# Patient Record
Sex: MALE | Race: OTHER | Employment: FULL TIME | URBAN - METROPOLITAN AREA
[De-identification: names, ages, dates, MRNs, and addresses within clinical notes are randomized per-mention and may not be internally consistent; named-entity substitution may affect disease eponyms.]

---

## 2017-10-12 ENCOUNTER — APPOINTMENT (OUTPATIENT)
Dept: LAB | Age: 27
End: 2017-10-12
Attending: FAMILY MEDICINE
Payer: COMMERCIAL

## 2017-10-12 ENCOUNTER — OFFICE VISIT (OUTPATIENT)
Dept: FAMILY MEDICINE CLINIC | Facility: CLINIC | Age: 27
End: 2017-10-12

## 2017-10-12 VITALS
SYSTOLIC BLOOD PRESSURE: 113 MMHG | BODY MASS INDEX: 30.07 KG/M2 | TEMPERATURE: 98 F | DIASTOLIC BLOOD PRESSURE: 72 MMHG | WEIGHT: 203 LBS | HEIGHT: 69 IN | HEART RATE: 53 BPM

## 2017-10-12 DIAGNOSIS — Z86.19 HISTORY OF CHLAMYDIA: ICD-10-CM

## 2017-10-12 DIAGNOSIS — R30.0 DYSURIA: ICD-10-CM

## 2017-10-12 DIAGNOSIS — B35.1 ONYCHOMYCOSIS OF TOENAIL: Primary | ICD-10-CM

## 2017-10-12 DIAGNOSIS — B35.1 ONYCHOMYCOSIS OF TOENAIL: ICD-10-CM

## 2017-10-12 PROCEDURE — 87591 N.GONORRHOEAE DNA AMP PROB: CPT

## 2017-10-12 PROCEDURE — 87491 CHLMYD TRACH DNA AMP PROBE: CPT

## 2017-10-12 PROCEDURE — 84450 TRANSFERASE (AST) (SGOT): CPT

## 2017-10-12 PROCEDURE — 86780 TREPONEMA PALLIDUM: CPT

## 2017-10-12 PROCEDURE — 99203 OFFICE O/P NEW LOW 30 MIN: CPT | Performed by: FAMILY MEDICINE

## 2017-10-12 PROCEDURE — 99212 OFFICE O/P EST SF 10 MIN: CPT | Performed by: FAMILY MEDICINE

## 2017-10-12 PROCEDURE — 84460 ALANINE AMINO (ALT) (SGPT): CPT

## 2017-10-12 PROCEDURE — 36415 COLL VENOUS BLD VENIPUNCTURE: CPT

## 2017-10-12 PROCEDURE — 87389 HIV-1 AG W/HIV-1&-2 AB AG IA: CPT

## 2017-10-12 PROCEDURE — 81003 URINALYSIS AUTO W/O SCOPE: CPT

## 2017-10-12 NOTE — PROGRESS NOTES
Patient ID: Liam Mireles is a 32year old male. HPI  Patient presents with:  Toenail Fungus  STD  He is a new patient. He states about 2 or 3 years ago he had terrible fungus on his toes and even on his fingernails.   He is placed on Lamisil for about 3 no genital warts. ASSESSMENT/PLAN:     Diagnoses and all orders for this visit:    Onychomycosis of toenail  -     AST (SGOT); Future  -     ALT (SGPT);  Future  Liver function tests and if all is normal he can do Lamisil again  Dysuria  -     URINA

## 2017-10-13 ENCOUNTER — PATIENT MESSAGE (OUTPATIENT)
Dept: FAMILY MEDICINE CLINIC | Facility: CLINIC | Age: 27
End: 2017-10-13

## 2017-11-14 ENCOUNTER — TELEPHONE (OUTPATIENT)
Dept: OTHER | Age: 27
End: 2017-11-14

## 2017-11-14 ENCOUNTER — PATIENT MESSAGE (OUTPATIENT)
Dept: FAMILY MEDICINE CLINIC | Facility: CLINIC | Age: 27
End: 2017-11-14

## 2017-11-14 RX ORDER — TERBINAFINE HYDROCHLORIDE 250 MG/1
250 TABLET ORAL DAILY
Qty: 30 TABLET | Refills: 1 | Status: SHIPPED | OUTPATIENT
Start: 2017-11-14 | End: 2018-05-16

## 2017-11-15 NOTE — TELEPHONE ENCOUNTER
Refill Protocol Appointment Criteria  · Appointment scheduled in the past 6 months or in the next 3 months  Recent Outpatient Visits            1 month ago Onychomycosis of toenail    [address], [place], [place]    Office Visit

## 2018-01-23 ENCOUNTER — OFFICE VISIT (OUTPATIENT)
Dept: ORTHOPEDICS CLINIC | Facility: CLINIC | Age: 28
End: 2018-01-23

## 2018-01-23 ENCOUNTER — HOSPITAL ENCOUNTER (OUTPATIENT)
Dept: GENERAL RADIOLOGY | Facility: HOSPITAL | Age: 28
Discharge: HOME | End: 2018-01-23
Attending: ORTHOPAEDIC SURGERY
Payer: COMMERCIAL

## 2018-01-23 DIAGNOSIS — M25.562 ACUTE PAIN OF LEFT KNEE: Primary | ICD-10-CM

## 2018-01-23 DIAGNOSIS — IMO0001 DISLOCATION: ICD-10-CM

## 2018-01-23 PROCEDURE — 99212 OFFICE O/P EST SF 10 MIN: CPT | Performed by: ORTHOPAEDIC SURGERY

## 2018-01-23 PROCEDURE — 99243 OFF/OP CNSLTJ NEW/EST LOW 30: CPT | Performed by: ORTHOPAEDIC SURGERY

## 2018-01-23 PROCEDURE — 73560 X-RAY EXAM OF KNEE 1 OR 2: CPT | Performed by: ORTHOPAEDIC SURGERY

## 2018-01-23 PROCEDURE — 73565 X-RAY EXAM OF KNEES: CPT | Performed by: ORTHOPAEDIC SURGERY

## 2018-01-23 NOTE — PROGRESS NOTES
1/23/2018  Fabiano Esquivel  12/26/1990  32year old   male  Johnnie Mckinnon MD    HPI:   Patient presents with:  Consult: Pt is here for his left knee. Knee feels weak. Has dislocated over 10 times thru out the past 4 years.  Each time this has happened it compartments are soft. The patient's lower extremities are warm and pink with brisk capillary refill and 2+ distal pulses. Sensation is intact to light touch in superficial peroneal, deep peroneal, sural, saphenous, and tibial nerve distributions.   The p

## 2018-01-24 ENCOUNTER — TELEPHONE (OUTPATIENT)
Dept: ORTHOPEDICS CLINIC | Facility: CLINIC | Age: 28
End: 2018-01-24

## 2018-01-24 RX ORDER — TERBINAFINE HYDROCHLORIDE 250 MG/1
250 TABLET ORAL DAILY
Qty: 30 TABLET | Refills: 1 | OUTPATIENT
Start: 2018-01-24

## 2018-01-24 NOTE — TELEPHONE ENCOUNTER
From: Glen Ramirez  Sent: 1/22/2018 8:50 AM CST  Subject: Medication Renewal Request    Glen Ramirez would like a refill of the following medications:     Terbinafine HCl (LAMISIL) 250 MG Oral Tab Celsa Lieberman DO]   Patient Comment: I need a refill    Pre

## 2018-01-24 NOTE — TELEPHONE ENCOUNTER
I have given him 30 with 2 refills. He should be on this medication only for 3 months maximum. It will take much longer than that for the new nail however to grow out.

## 2018-01-24 NOTE — TELEPHONE ENCOUNTER
Called [facility] and s/w Lorrene Pill to initiate RQI for MRI left knee. She states pt is coming up as termed in her system. Need to call the health plan direct to see if RQI needed. Called [employer] [place] and s/w maura to see if pt requires RQI or PA for MRI left knee.  Sh

## 2018-01-24 NOTE — TELEPHONE ENCOUNTER
Please advise regarding pended refill request as does not fall under a protocol.      ALT/AST checked 10/12/17 before beginning toe nail fungus tx were normal    Last Rx= 11/14/17 #30 & 1-R  Recent Outpatient Visits            3 months ago Onychomycosis of

## 2018-01-26 ENCOUNTER — PATIENT MESSAGE (OUTPATIENT)
Dept: FAMILY MEDICINE CLINIC | Facility: CLINIC | Age: 28
End: 2018-01-26

## 2018-01-27 RX ORDER — TERBINAFINE HYDROCHLORIDE 250 MG/1
250 TABLET ORAL DAILY
Qty: 30 TABLET | Refills: 1 | OUTPATIENT
Start: 2018-01-27

## 2018-01-27 NOTE — TELEPHONE ENCOUNTER
From: Liam Mireles  To: Roberta Cortés DO  Sent: 1/26/2018 4:39 PM CST  Subject: Prescription Question    Hi, I need a refill for my toes. I've requested it a few times but never got a response.     Thank you  Jacqui Manuel

## 2018-01-27 NOTE — TELEPHONE ENCOUNTER
I have given him 30 with 2 refills. He should be on this medication only for 3 months maximum. It will take much longer than that for the new nail however to grow out.       Electronically signed by Stacey Nguyen, DO at 1/24/2018  3:22 PM    Just because

## 2018-01-27 NOTE — TELEPHONE ENCOUNTER
Called pt to triage if fungus of toenails is present?  Last LFT 10/2017 wnl, LMTCB or to respond via mychart

## 2018-01-29 NOTE — TELEPHONE ENCOUNTER
Advised patient of Dr. Jordan Dorsey note below. Patient verbalized understanding. Instructed patient to keep toes dry and take precaution when using public showers/gym.

## 2018-01-29 NOTE — TELEPHONE ENCOUNTER
From: Sharee Stock  Sent: 1/25/2018 12:16 PM CST  Subject: Medication Renewal Request    Sharee Stock would like a refill of the following medications:     Terbinafine HCl (LAMISIL) 250 MG Oral Tab Tika Nickerson DO]    Preferred pharmacy: [facility] [address] [place] [address], [phone], [phone]

## 2018-02-03 ENCOUNTER — HOSPITAL ENCOUNTER (OUTPATIENT)
Dept: MRI IMAGING | Facility: HOSPITAL | Age: 28
Discharge: HOME | End: 2018-02-03
Attending: ORTHOPAEDIC SURGERY
Payer: COMMERCIAL

## 2018-02-03 DIAGNOSIS — M25.562 ACUTE PAIN OF LEFT KNEE: ICD-10-CM

## 2018-02-03 PROCEDURE — 73721 MRI JNT OF LWR EXTRE W/O DYE: CPT | Performed by: ORTHOPAEDIC SURGERY

## 2018-02-09 ENCOUNTER — OFFICE VISIT (OUTPATIENT)
Dept: ORTHOPEDICS CLINIC | Facility: CLINIC | Age: 28
End: 2018-02-09

## 2018-02-09 DIAGNOSIS — S83.282A ACUTE LATERAL MENISCUS TEAR OF LEFT KNEE, INITIAL ENCOUNTER: ICD-10-CM

## 2018-02-09 DIAGNOSIS — S83.512A NEW ACL TEAR, LEFT, INITIAL ENCOUNTER: Primary | ICD-10-CM

## 2018-02-09 DIAGNOSIS — S83.242A ACUTE MEDIAL MENISCUS TEAR, LEFT, INITIAL ENCOUNTER: ICD-10-CM

## 2018-02-09 PROCEDURE — 99212 OFFICE O/P EST SF 10 MIN: CPT | Performed by: ORTHOPAEDIC SURGERY

## 2018-02-09 PROCEDURE — 99213 OFFICE O/P EST LOW 20 MIN: CPT | Performed by: ORTHOPAEDIC SURGERY

## 2018-02-09 NOTE — PROGRESS NOTES
2/9/2018  Mary Beth Pulling  12/26/1990  32year old   male  Ishmael Schwab MD    HPI:   Patient presents with:  Knee Pain: Left f/u and MRI results - states he has pain only when it navi.     This is a pleasant 59-year-old male that comes in today for MR initial encounter  (primary encounter diagnosis)  Acute medial meniscus tear, left, initial encounter  Acute lateral meniscus tear of left knee, initial encounter    This is a pleasant 80-year-old male that sustained a left knee ACL tear, medial meniscus t

## 2018-03-22 ENCOUNTER — TELEPHONE (OUTPATIENT)
Dept: FAMILY MEDICINE CLINIC | Facility: CLINIC | Age: 28
End: 2018-03-22

## 2018-03-22 DIAGNOSIS — B35.1 ONYCHOMYCOSIS OF TOENAIL: Primary | ICD-10-CM

## 2018-03-22 NOTE — TELEPHONE ENCOUNTER
We need to have him see podiatry. They may need to take some samples of the toes that are not getting cleared up to find out if it is a different type of fungus that does not get cleared up with the Lamisil.   I would do a referral.

## 2018-03-22 NOTE — TELEPHONE ENCOUNTER
----- Message from Erich Rhodes sent at 3/20/2018 10:28 AM CDT -----  Regarding: Non-Urgent Medical Question  Contact: [phone]  Hi, so my toe nail fungus never fully went away and now it's slowly getting worse. What do you recommend me to do?     Thank

## 2018-03-22 NOTE — TELEPHONE ENCOUNTER
Spoke with patient who states original treatment cleared up the nail fungus. States that his other toes were originally effected and that has cleared up. States this is only occurring on his big toes.  He states that the right big toe has two streak

## 2018-05-10 ENCOUNTER — TELEPHONE (OUTPATIENT)
Dept: ORTHOPEDICS CLINIC | Facility: CLINIC | Age: 28
End: 2018-05-10

## 2018-05-10 NOTE — TELEPHONE ENCOUNTER
Surgery outpatient with Dr. Sagar Bell  5-64-05  [facility]  Left knee scope acl reconstruction with patella autograft vs quad autograft  mmr vs pmm  lmr vs plm    [id number]    [id number]  Diagnosis codes : E43.155D   K85.744B    B08.184A    Call to [address] [place] [address]

## 2018-05-22 ENCOUNTER — OFFICE VISIT (OUTPATIENT)
Dept: ORTHOPEDICS CLINIC | Facility: CLINIC | Age: 28
End: 2018-05-22

## 2018-05-22 DIAGNOSIS — S83.242A ACUTE MEDIAL MENISCUS TEAR OF LEFT KNEE, INITIAL ENCOUNTER: ICD-10-CM

## 2018-05-22 DIAGNOSIS — S83.282A ACUTE LATERAL MENISCUS TEAR OF LEFT KNEE, INITIAL ENCOUNTER: ICD-10-CM

## 2018-05-22 DIAGNOSIS — S83.512A NEW ACL TEAR, LEFT, INITIAL ENCOUNTER: Primary | ICD-10-CM

## 2018-05-22 PROCEDURE — 99212 OFFICE O/P EST SF 10 MIN: CPT | Performed by: ORTHOPAEDIC SURGERY

## 2018-05-22 PROCEDURE — 99213 OFFICE O/P EST LOW 20 MIN: CPT | Performed by: ORTHOPAEDIC SURGERY

## 2018-05-22 NOTE — H&P
5/22/2018  Nallely Mac  12/26/1990  32year old   male  Rashid Buchanan MD    HPI:   Patient presents with:  Pre-Op: Left ACL - has sx scheduled for 5/24/18 - has no pain in the knee     This is a pleasant 20-year-old male that sustained a left knee [facility] encounter diagnosis)  Acute medial meniscus tear of left knee, initial encounter  Acute lateral meniscus tear of left knee, initial encounter    The risks, indications, benefits, and procedures of both operative and non-operative treatment were discussed w

## 2018-05-23 NOTE — TELEPHONE ENCOUNTER
From pt via email - FMLA form for Dr. Damon Patel [employer]+ Signed release. Paid $25 over phone, receipt emailed to pt. Logged for processing.  NK

## 2018-05-24 ENCOUNTER — SURGERY (OUTPATIENT)
Age: 28
End: 2018-05-24

## 2018-05-24 ENCOUNTER — APPOINTMENT (OUTPATIENT)
Dept: GENERAL RADIOLOGY | Facility: HOSPITAL | Age: 28
End: 2018-05-24
Attending: ORTHOPAEDIC SURGERY
Payer: COMMERCIAL

## 2018-05-24 ENCOUNTER — ANESTHESIA EVENT (OUTPATIENT)
Dept: SURGERY | Facility: HOSPITAL | Age: 28
End: 2018-05-24
Payer: COMMERCIAL

## 2018-05-24 ENCOUNTER — HOSPITAL ENCOUNTER (OUTPATIENT)
Facility: HOSPITAL | Age: 28
Setting detail: HOSPITAL OUTPATIENT SURGERY
Discharge: HOME | End: 2018-05-24
Attending: ORTHOPAEDIC SURGERY | Admitting: ORTHOPAEDIC SURGERY
Payer: COMMERCIAL

## 2018-05-24 ENCOUNTER — ANESTHESIA (OUTPATIENT)
Dept: SURGERY | Facility: HOSPITAL | Age: 28
End: 2018-05-24
Payer: COMMERCIAL

## 2018-05-24 VITALS
BODY MASS INDEX: 28.19 KG/M2 | DIASTOLIC BLOOD PRESSURE: 88 MMHG | RESPIRATION RATE: 15 BRPM | HEIGHT: 69 IN | SYSTOLIC BLOOD PRESSURE: 143 MMHG | TEMPERATURE: 98 F | WEIGHT: 190.31 LBS | OXYGEN SATURATION: 100 % | HEART RATE: 75 BPM

## 2018-05-24 DIAGNOSIS — S83.512A: ICD-10-CM

## 2018-05-24 DIAGNOSIS — S83.282A ACUTE LATERAL MENISCUS TEAR OF LEFT KNEE, INITIAL ENCOUNTER: ICD-10-CM

## 2018-05-24 DIAGNOSIS — S83.242A ACUTE MEDIAL MENISCAL TEAR, LEFT, INITIAL ENCOUNTER: ICD-10-CM

## 2018-05-24 PROCEDURE — 64447 NJX AA&/STRD FEMORAL NRV IMG: CPT | Performed by: ORTHOPAEDIC SURGERY

## 2018-05-24 PROCEDURE — 3E0T3BZ INTRODUCTION OF ANESTHETIC AGENT INTO PERIPHERAL NERVES AND PLEXI, PERCUTANEOUS APPROACH: ICD-10-PCS | Performed by: ANESTHESIOLOGY

## 2018-05-24 PROCEDURE — 76001 XR C-ARM FLUORO >1 HOUR  (CPT=76001): CPT | Performed by: ORTHOPAEDIC SURGERY

## 2018-05-24 PROCEDURE — 99152 MOD SED SAME PHYS/QHP 5/>YRS: CPT | Performed by: ORTHOPAEDIC SURGERY

## 2018-05-24 PROCEDURE — 76942 ECHO GUIDE FOR BIOPSY: CPT | Performed by: ORTHOPAEDIC SURGERY

## 2018-05-24 PROCEDURE — 0SBD4ZZ EXCISION OF LEFT KNEE JOINT, PERCUTANEOUS ENDOSCOPIC APPROACH: ICD-10-PCS | Performed by: ORTHOPAEDIC SURGERY

## 2018-05-24 PROCEDURE — 73560 X-RAY EXAM OF KNEE 1 OR 2: CPT | Performed by: ORTHOPAEDIC SURGERY

## 2018-05-24 PROCEDURE — 0MUP47Z SUPPLEMENT LEFT KNEE BURSA AND LIGAMENT WITH AUTOLOGOUS TISSUE SUBSTITUTE, PERCUTANEOUS ENDOSCOPIC APPROACH: ICD-10-PCS | Performed by: ORTHOPAEDIC SURGERY

## 2018-05-24 DEVICE — SCREW ACL BIO COMP AR-1380C: Type: IMPLANTABLE DEVICE | Site: KNEE | Status: FUNCTIONAL

## 2018-05-24 DEVICE — IMPLANTABLE DEVICE: Type: IMPLANTABLE DEVICE | Site: KNEE | Status: FUNCTIONAL

## 2018-05-24 RX ORDER — CEFAZOLIN SODIUM/WATER 2 G/20 ML
SYRINGE (ML) INTRAVENOUS AS NEEDED
Status: DISCONTINUED | OUTPATIENT
Start: 2018-05-24 | End: 2018-05-24 | Stop reason: SURG

## 2018-05-24 RX ORDER — ONDANSETRON 2 MG/ML
4 INJECTION INTRAMUSCULAR; INTRAVENOUS EVERY 6 HOURS PRN
Status: CANCELLED | OUTPATIENT
Start: 2018-05-24

## 2018-05-24 RX ORDER — ASPIRIN 325 MG
325 TABLET ORAL DAILY
Qty: 14 TABLET | Refills: 0 | Status: SHIPPED | OUTPATIENT
Start: 2018-05-24 | End: 2018-10-26

## 2018-05-24 RX ORDER — DEXAMETHASONE SODIUM PHOSPHATE 4 MG/ML
VIAL (ML) INJECTION AS NEEDED
Status: DISCONTINUED | OUTPATIENT
Start: 2018-05-24 | End: 2018-05-24 | Stop reason: SURG

## 2018-05-24 RX ORDER — ONDANSETRON 2 MG/ML
4 INJECTION INTRAMUSCULAR; INTRAVENOUS ONCE AS NEEDED
Status: COMPLETED | OUTPATIENT
Start: 2018-05-24 | End: 2018-05-24

## 2018-05-24 RX ORDER — ACETAMINOPHEN 500 MG
1000 TABLET ORAL ONCE
Status: COMPLETED | OUTPATIENT
Start: 2018-05-24 | End: 2018-05-24

## 2018-05-24 RX ORDER — HYDROCODONE BITARTRATE AND ACETAMINOPHEN 5; 325 MG/1; MG/1
1 TABLET ORAL AS NEEDED
Status: DISCONTINUED | OUTPATIENT
Start: 2018-05-24 | End: 2018-05-24

## 2018-05-24 RX ORDER — HYDROCODONE BITARTRATE AND ACETAMINOPHEN 5; 325 MG/1; MG/1
2 TABLET ORAL AS NEEDED
Status: DISCONTINUED | OUTPATIENT
Start: 2018-05-24 | End: 2018-05-24

## 2018-05-24 RX ORDER — METOCLOPRAMIDE 10 MG/1
10 TABLET ORAL ONCE
Status: DISCONTINUED | OUTPATIENT
Start: 2018-05-24 | End: 2018-05-24 | Stop reason: HOSPADM

## 2018-05-24 RX ORDER — SODIUM CHLORIDE, SODIUM LACTATE, POTASSIUM CHLORIDE, CALCIUM CHLORIDE 600; 310; 30; 20 MG/100ML; MG/100ML; MG/100ML; MG/100ML
INJECTION, SOLUTION INTRAVENOUS CONTINUOUS
Status: DISCONTINUED | OUTPATIENT
Start: 2018-05-24 | End: 2018-05-24

## 2018-05-24 RX ORDER — ONDANSETRON 2 MG/ML
INJECTION INTRAMUSCULAR; INTRAVENOUS AS NEEDED
Status: DISCONTINUED | OUTPATIENT
Start: 2018-05-24 | End: 2018-05-24 | Stop reason: SURG

## 2018-05-24 RX ORDER — HYDROMORPHONE HYDROCHLORIDE 1 MG/ML
0.2 INJECTION, SOLUTION INTRAMUSCULAR; INTRAVENOUS; SUBCUTANEOUS EVERY 5 MIN PRN
Status: DISCONTINUED | OUTPATIENT
Start: 2018-05-24 | End: 2018-05-24

## 2018-05-24 RX ORDER — LIDOCAINE HYDROCHLORIDE 10 MG/ML
INJECTION, SOLUTION EPIDURAL; INFILTRATION; INTRACAUDAL; PERINEURAL AS NEEDED
Status: DISCONTINUED | OUTPATIENT
Start: 2018-05-24 | End: 2018-05-24 | Stop reason: SURG

## 2018-05-24 RX ORDER — FAMOTIDINE 20 MG/1
20 TABLET ORAL ONCE
Status: DISCONTINUED | OUTPATIENT
Start: 2018-05-24 | End: 2018-05-24 | Stop reason: HOSPADM

## 2018-05-24 RX ORDER — NALOXONE HYDROCHLORIDE 0.4 MG/ML
80 INJECTION, SOLUTION INTRAMUSCULAR; INTRAVENOUS; SUBCUTANEOUS AS NEEDED
Status: DISCONTINUED | OUTPATIENT
Start: 2018-05-24 | End: 2018-05-24

## 2018-05-24 RX ORDER — HYDROCODONE BITARTRATE AND ACETAMINOPHEN 10; 325 MG/1; MG/1
1-2 TABLET ORAL EVERY 6 HOURS PRN
Qty: 40 TABLET | Refills: 0 | Status: SHIPPED | OUTPATIENT
Start: 2018-05-24 | End: 2018-10-26

## 2018-05-24 RX ORDER — HYDROMORPHONE HYDROCHLORIDE 1 MG/ML
0.4 INJECTION, SOLUTION INTRAMUSCULAR; INTRAVENOUS; SUBCUTANEOUS EVERY 5 MIN PRN
Status: DISCONTINUED | OUTPATIENT
Start: 2018-05-24 | End: 2018-05-24

## 2018-05-24 RX ORDER — MELOXICAM 15 MG/1
15 TABLET ORAL DAILY
Qty: 30 TABLET | Refills: 0 | Status: SHIPPED | OUTPATIENT
Start: 2018-05-24 | End: 2018-10-26

## 2018-05-24 RX ORDER — HYDROMORPHONE HYDROCHLORIDE 1 MG/ML
0.6 INJECTION, SOLUTION INTRAMUSCULAR; INTRAVENOUS; SUBCUTANEOUS EVERY 5 MIN PRN
Status: DISCONTINUED | OUTPATIENT
Start: 2018-05-24 | End: 2018-05-24

## 2018-05-24 RX ORDER — CEFAZOLIN SODIUM/WATER 2 G/20 ML
2 SYRINGE (ML) INTRAVENOUS ONCE
Status: DISCONTINUED | OUTPATIENT
Start: 2018-05-24 | End: 2018-05-24 | Stop reason: HOSPADM

## 2018-05-24 RX ADMIN — CEFAZOLIN SODIUM/WATER 2 G: 2 G/20 ML SYRINGE (ML) INTRAVENOUS at 13:40:00

## 2018-05-24 RX ADMIN — SODIUM CHLORIDE, SODIUM LACTATE, POTASSIUM CHLORIDE, CALCIUM CHLORIDE: 600; 310; 30; 20 INJECTION, SOLUTION INTRAVENOUS at 16:58:00

## 2018-05-24 RX ADMIN — DEXAMETHASONE SODIUM PHOSPHATE 4 MG: 4 MG/ML VIAL (ML) INJECTION at 13:44:00

## 2018-05-24 RX ADMIN — LIDOCAINE HYDROCHLORIDE 50 MG: 10 INJECTION, SOLUTION EPIDURAL; INFILTRATION; INTRACAUDAL; PERINEURAL at 13:40:00

## 2018-05-24 RX ADMIN — SODIUM CHLORIDE, SODIUM LACTATE, POTASSIUM CHLORIDE, CALCIUM CHLORIDE: 600; 310; 30; 20 INJECTION, SOLUTION INTRAVENOUS at 13:38:00

## 2018-05-24 RX ADMIN — ONDANSETRON 4 MG: 2 INJECTION INTRAMUSCULAR; INTRAVENOUS at 13:44:00

## 2018-05-24 NOTE — INTERVAL H&P NOTE
Pre-op Diagnosis: Acute lateral meniscus tear of left knee, initial encounter [S83.282A]  New tear of anterior cruciate ligament, left, initial encounter [S83.512A]  Acute medial meniscal tear, left, initial encounter [S83.242A]    The above referenced H&P

## 2018-05-24 NOTE — H&P (VIEW-ONLY)
5/22/2018  Reji Fraction  12/26/1990  32year old   male  Lacey Calderon MD    HPI:   Patient presents with:  Pre-Op: Left ACL - has sx scheduled for 5/24/18 - has no pain in the knee     This is a pleasant 24-year-old male that sustained a left knee [facility] encounter diagnosis)  Acute medial meniscus tear of left knee, initial encounter  Acute lateral meniscus tear of left knee, initial encounter    The risks, indications, benefits, and procedures of both operative and non-operative treatment were discussed w

## 2018-05-24 NOTE — ANESTHESIA PROCEDURE NOTES
Peripheral Block    Anesthesiologist:  Ruth Cornelius  Performed by:   Anesthesiologist  Patient Location:  PACU  Start Time:  5/24/2018 1:26 PM  End Time:  5/24/2018 1:31 PM  Site Identification: ultrasound guided, real time ultrasound guided and surface

## 2018-05-24 NOTE — ANESTHESIA POSTPROCEDURE EVALUATION
Patient: Dinh Tang    Procedure Summary     Date:  05/24/18 Room / Location:  [address] [place] [address] MAIN OR 02 / [address] [place] [address] MAIN OR    Anesthesia Start:  2429 Anesthesia Stop:      Procedure:  KNEE ARTHROSCOPY ACL RECONSTRUCTION (Left ) Diagnosis:       Acute lateral meniscus te

## 2018-05-24 NOTE — ANESTHESIA PREPROCEDURE EVALUATION
Anesthesia PreOp Note    HPI:     Dinh Tang is a 32year old male who presents for preoperative consultation requested by: Michelle Nicholas MD    Date of Surgery: 5/24/2018    Procedure(s):  KNEE ARTHROSCOPY ACL RECONSTRUCTION  Indication: Acute late 325 MG Oral Tab Take 1 tablet (325 mg total) by mouth daily. Disp: 14 tablet Rfl: 0   Meloxicam 15 MG Oral Tab Take 1 tablet (15 mg total) by mouth daily. Take with food Disp: 30 tablet Rfl: 0       No Known Allergies    History reviewed.  No pertinent fami risks, major complications, and any alternative forms of anesthetic management. All of the patient's questions were answered to the best of my ability. The patient desires the anesthetic management as planned.   Manuela JARAMILLO  5/24/2018 1:56 PM

## 2018-05-24 NOTE — BRIEF OP NOTE
Pre-Operative Diagnosis: Acute lateral meniscus tear of left knee, initial encounter [S83.282A]New tear of anterior cruciate ligament, left, initial encounter [S83.512A]Acute medial meniscal tear, left, initial encounter [facility]

## 2018-05-25 ENCOUNTER — HOSPITAL ENCOUNTER (OUTPATIENT)
Dept: GENERAL RADIOLOGY | Facility: HOSPITAL | Age: 28
Discharge: HOME | End: 2018-05-25
Attending: ORTHOPAEDIC SURGERY
Payer: COMMERCIAL

## 2018-05-25 ENCOUNTER — OFFICE VISIT (OUTPATIENT)
Dept: ORTHOPEDICS CLINIC | Facility: CLINIC | Age: 28
End: 2018-05-25

## 2018-05-25 DIAGNOSIS — Z47.89 ORTHOPEDIC AFTERCARE: ICD-10-CM

## 2018-05-25 DIAGNOSIS — S83.512A NEW ACL TEAR, LEFT, INITIAL ENCOUNTER: Primary | ICD-10-CM

## 2018-05-25 DIAGNOSIS — S83.282A ACUTE LATERAL MENISCUS TEAR OF LEFT KNEE, INITIAL ENCOUNTER: ICD-10-CM

## 2018-05-25 DIAGNOSIS — S83.242A ACUTE MEDIAL MENISCUS TEAR OF LEFT KNEE, INITIAL ENCOUNTER: ICD-10-CM

## 2018-05-25 PROCEDURE — 99212 OFFICE O/P EST SF 10 MIN: CPT | Performed by: ORTHOPAEDIC SURGERY

## 2018-05-25 PROCEDURE — 99024 POSTOP FOLLOW-UP VISIT: CPT | Performed by: ORTHOPAEDIC SURGERY

## 2018-05-25 PROCEDURE — 73560 X-RAY EXAM OF KNEE 1 OR 2: CPT | Performed by: ORTHOPAEDIC SURGERY

## 2018-05-25 RX ORDER — TRAMADOL HYDROCHLORIDE 50 MG/1
50 TABLET ORAL EVERY 6 HOURS PRN
Qty: 40 TABLET | Refills: 0 | Status: SHIPPED | OUTPATIENT
Start: 2018-05-25 | End: 2018-10-26

## 2018-05-25 NOTE — TELEPHONE ENCOUNTER
Dr. Tamara Heard,    Please sign off on form:  -Highlight the patient and hit \"Chart\" button. -In Chart Review, w/in the Encounter tab - click 1 time on the Telephone call encounter for 05/23/18. Scroll down the telephone encounter.  -Click \"scan on\" blue Hyperlink under \"Media\" heading for  PPD FMLA Dr. Tamara Heard 05/25/18 w/in the telephone enc.  -Click on Acknowledge button at the bottom right corner and left-click onto image, signature stamp appears and drag signature to Provider signature line. Stamp will turn blue. Close window.     Thank you,  Tonia Amador

## 2018-05-25 NOTE — PROGRESS NOTES
This is a pleasant 22-year-old male that is 1 day status post left knee arthroscopy, ACL reconstruction with quad tendon autograft, social medial lateral meniscectomy. The patient has had no fevers or chills, chest pain, or shortness of breath.   He comes

## 2018-05-25 NOTE — OPERATIVE REPORT
[facility]    PATIENT'S NAME: VIDA Richardson   ATTENDING PHYSICIAN: Lanie Castillo MD   OPERATING PHYSICIAN: Lanie Castillo MD   PATIENT ACCOUNT#:   [id number]    LOCATION:  [facility] 6 [facility] 10  MEDICAL RECORD #:   [id number]       DATE OF BIR intubated successfully. The patient's left lower extremity was prepped and draped in standard surgical fashion. Perioperative antibiotics were infused. Confirmation of identity and laterality took place. Time-out was performed.   A well-padded tournique intercondylar notch was entered. There was a 3 mm ganglion cyst on the PCL that was debrided. There was an empty lateral wall sign. At this time, the knee was hyperflexed. A 7 mm back wall protector was placed.   A drill pin was then drilled out the dis placed from the toes to middle of the thigh. The patient was extubated successfully and transferred to the recovery room in stable condition. DISPOSITION:  The patient is 50% weightbearing on the left lower extremity.   He will follow up in 1 day for hi

## 2018-05-26 NOTE — TELEPHONE ENCOUNTER
Form signed by provider and faxed to [facility] Attn: Lv Ordoñez at [phone]. 5-.    NOLAN for pt W

## 2018-05-29 ENCOUNTER — OFFICE VISIT (OUTPATIENT)
Dept: PHYSICAL THERAPY | Age: 28
End: 2018-05-29
Attending: ORTHOPAEDIC SURGERY
Payer: COMMERCIAL

## 2018-05-29 DIAGNOSIS — S83.512A NEW ACL TEAR, LEFT, INITIAL ENCOUNTER: ICD-10-CM

## 2018-05-29 DIAGNOSIS — S83.282A ACUTE LATERAL MENISCUS TEAR OF LEFT KNEE, INITIAL ENCOUNTER: ICD-10-CM

## 2018-05-29 DIAGNOSIS — S83.242A ACUTE MEDIAL MENISCUS TEAR OF LEFT KNEE, INITIAL ENCOUNTER: ICD-10-CM

## 2018-05-29 PROCEDURE — 97161 PT EVAL LOW COMPLEX 20 MIN: CPT | Performed by: PHYSICAL THERAPIST

## 2018-05-29 PROCEDURE — 97530 THERAPEUTIC ACTIVITIES: CPT | Performed by: PHYSICAL THERAPIST

## 2018-05-29 NOTE — PROGRESS NOTES
P.T. EVALUATION:   Referring Physician: Dr. Milton Khan  Diagnosis: New ACL tear, left, initial encounter (X59.092J)  Acute medial meniscus tear of left knee, initial encounter (E79.389M)  Acute lateral meniscus tear of left knee, initial encounter (H86.447A) independently with bilateral crutches. 25-50% weight bearing on L LE. Today’s Treatment and Response:  Patient education provided on PT eval findings, treatment plan, goals, HEP.   Patient received today:  PT Eval Low Complexity,   Therapeutic activity x

## 2018-06-04 ENCOUNTER — OFFICE VISIT (OUTPATIENT)
Dept: PHYSICAL THERAPY | Age: 28
End: 2018-06-04
Attending: ORTHOPAEDIC SURGERY
Payer: COMMERCIAL

## 2018-06-04 DIAGNOSIS — S83.512A NEW ACL TEAR, LEFT, INITIAL ENCOUNTER: ICD-10-CM

## 2018-06-04 DIAGNOSIS — S83.282A ACUTE LATERAL MENISCUS TEAR OF LEFT KNEE, INITIAL ENCOUNTER: ICD-10-CM

## 2018-06-04 DIAGNOSIS — S83.242A ACUTE MEDIAL MENISCUS TEAR OF LEFT KNEE, INITIAL ENCOUNTER: ICD-10-CM

## 2018-06-04 PROCEDURE — 97110 THERAPEUTIC EXERCISES: CPT

## 2018-06-04 PROCEDURE — 97014 ELECTRIC STIMULATION THERAPY: CPT

## 2018-06-04 NOTE — PROGRESS NOTES
Diagnosis: New ACL tear, left, initial encounter (G47.937N)  Acute medial meniscus tear of left knee, initial encounter (P83.660X)  Acute lateral meniscus tear of left knee, initial encounter (B39.433U)    Date of Onset: 5/24/2018        Next MD visit: non

## 2018-06-06 ENCOUNTER — OFFICE VISIT (OUTPATIENT)
Dept: PHYSICAL THERAPY | Age: 28
End: 2018-06-06
Attending: ORTHOPAEDIC SURGERY
Payer: COMMERCIAL

## 2018-06-06 DIAGNOSIS — S83.282A ACUTE LATERAL MENISCUS TEAR OF LEFT KNEE, INITIAL ENCOUNTER: ICD-10-CM

## 2018-06-06 DIAGNOSIS — S83.242A ACUTE MEDIAL MENISCUS TEAR OF LEFT KNEE, INITIAL ENCOUNTER: ICD-10-CM

## 2018-06-06 DIAGNOSIS — S83.512A NEW ACL TEAR, LEFT, INITIAL ENCOUNTER: ICD-10-CM

## 2018-06-06 PROCEDURE — 97110 THERAPEUTIC EXERCISES: CPT

## 2018-06-06 PROCEDURE — 97014 ELECTRIC STIMULATION THERAPY: CPT

## 2018-06-06 NOTE — PROGRESS NOTES
Diagnosis: New ACL tear, left, initial encounter (J17.760D)  Acute medial meniscus tear of left knee, initial encounter (H37.491N)  Acute lateral meniscus tear of left knee, initial encounter (E45.487F)    Date of Onset: 5/24/2018        Next MD visit: 6/2

## 2018-06-11 ENCOUNTER — TELEPHONE (OUTPATIENT)
Dept: PHYSICAL THERAPY | Age: 28
End: 2018-06-11

## 2018-06-13 ENCOUNTER — OFFICE VISIT (OUTPATIENT)
Dept: PHYSICAL THERAPY | Age: 28
End: 2018-06-13
Attending: ORTHOPAEDIC SURGERY
Payer: COMMERCIAL

## 2018-06-13 DIAGNOSIS — S83.242A ACUTE MEDIAL MENISCUS TEAR OF LEFT KNEE, INITIAL ENCOUNTER: ICD-10-CM

## 2018-06-13 DIAGNOSIS — S83.512A NEW ACL TEAR, LEFT, INITIAL ENCOUNTER: ICD-10-CM

## 2018-06-13 DIAGNOSIS — S83.282A ACUTE LATERAL MENISCUS TEAR OF LEFT KNEE, INITIAL ENCOUNTER: ICD-10-CM

## 2018-06-13 PROCEDURE — 97014 ELECTRIC STIMULATION THERAPY: CPT

## 2018-06-13 PROCEDURE — 97110 THERAPEUTIC EXERCISES: CPT

## 2018-06-13 NOTE — PROGRESS NOTES
Diagnosis: New ACL tear, left, initial encounter (F74.365A)  Acute medial meniscus tear of left knee, initial encounter (E85.460B)  Acute lateral meniscus tear of left knee, initial encounter (Y95.992J)    Date of Onset: 5/24/2018        Next MD visit: 6/2 min

## 2018-06-18 ENCOUNTER — OFFICE VISIT (OUTPATIENT)
Dept: PHYSICAL THERAPY | Age: 28
End: 2018-06-18
Attending: ORTHOPAEDIC SURGERY
Payer: COMMERCIAL

## 2018-06-18 DIAGNOSIS — S83.512A NEW ACL TEAR, LEFT, INITIAL ENCOUNTER: ICD-10-CM

## 2018-06-18 DIAGNOSIS — S83.282A ACUTE LATERAL MENISCUS TEAR OF LEFT KNEE, INITIAL ENCOUNTER: ICD-10-CM

## 2018-06-18 DIAGNOSIS — S83.242A ACUTE MEDIAL MENISCUS TEAR OF LEFT KNEE, INITIAL ENCOUNTER: ICD-10-CM

## 2018-06-18 PROCEDURE — 97110 THERAPEUTIC EXERCISES: CPT | Performed by: PHYSICAL THERAPIST

## 2018-06-18 NOTE — PROGRESS NOTES
Diagnosis: New ACL tear, left, initial encounter (O80.768W)  Acute medial meniscus tear of left knee, initial encounter (E70.942Z)  Acute lateral meniscus tear of left knee, initial encounter (Y10.154S)    Date of Onset: 5/24/2018        Next MD visit: 6/2

## 2018-06-20 ENCOUNTER — OFFICE VISIT (OUTPATIENT)
Dept: PHYSICAL THERAPY | Age: 28
End: 2018-06-20
Attending: ORTHOPAEDIC SURGERY
Payer: COMMERCIAL

## 2018-06-20 DIAGNOSIS — S83.282A ACUTE LATERAL MENISCUS TEAR OF LEFT KNEE, INITIAL ENCOUNTER: ICD-10-CM

## 2018-06-20 DIAGNOSIS — S83.512A NEW ACL TEAR, LEFT, INITIAL ENCOUNTER: ICD-10-CM

## 2018-06-20 DIAGNOSIS — S83.242A ACUTE MEDIAL MENISCUS TEAR OF LEFT KNEE, INITIAL ENCOUNTER: ICD-10-CM

## 2018-06-20 PROCEDURE — 97014 ELECTRIC STIMULATION THERAPY: CPT

## 2018-06-20 PROCEDURE — 97110 THERAPEUTIC EXERCISES: CPT

## 2018-06-20 NOTE — PROGRESS NOTES
Diagnosis: New ACL tear, left, initial encounter (G31.661S)  Acute medial meniscus tear of left knee, initial encounter (Z25.934J)  Acute lateral meniscus tear of left knee, initial encounter (R18.044Q)    Date of Onset: 5/24/2018        Next MD visit: 6/2

## 2018-06-22 ENCOUNTER — OFFICE VISIT (OUTPATIENT)
Dept: ORTHOPEDICS CLINIC | Facility: CLINIC | Age: 28
End: 2018-06-22

## 2018-06-22 DIAGNOSIS — S83.512A NEW ACL TEAR, LEFT, INITIAL ENCOUNTER: Primary | ICD-10-CM

## 2018-06-22 DIAGNOSIS — S83.282A ACUTE LATERAL MENISCUS TEAR OF LEFT KNEE, INITIAL ENCOUNTER: ICD-10-CM

## 2018-06-22 DIAGNOSIS — S83.242A ACUTE MEDIAL MENISCUS TEAR OF LEFT KNEE, INITIAL ENCOUNTER: ICD-10-CM

## 2018-06-22 PROCEDURE — 99212 OFFICE O/P EST SF 10 MIN: CPT | Performed by: ORTHOPAEDIC SURGERY

## 2018-06-22 PROCEDURE — 99024 POSTOP FOLLOW-UP VISIT: CPT | Performed by: ORTHOPAEDIC SURGERY

## 2018-06-22 NOTE — PROGRESS NOTES
This is a pleasant 79-year-old male that is 1 month status post left knee arthroscopy, ACL reconstruction with quadriceps tendon autograft, and partial medial and lateral meniscectomy. The patient has had no chest pain or shortness of breath.   The patient

## 2018-06-25 ENCOUNTER — APPOINTMENT (OUTPATIENT)
Dept: PHYSICAL THERAPY | Age: 28
End: 2018-06-25
Attending: ORTHOPAEDIC SURGERY
Payer: COMMERCIAL

## 2018-06-26 ENCOUNTER — OFFICE VISIT (OUTPATIENT)
Dept: PHYSICAL THERAPY | Age: 28
End: 2018-06-26
Attending: ORTHOPAEDIC SURGERY
Payer: COMMERCIAL

## 2018-06-26 PROCEDURE — 97014 ELECTRIC STIMULATION THERAPY: CPT

## 2018-06-26 PROCEDURE — 97110 THERAPEUTIC EXERCISES: CPT

## 2018-06-26 NOTE — PROGRESS NOTES
Diagnosis: New ACL tear, left, initial encounter (D70.062B)  Acute medial meniscus tear of left knee, initial encounter (Y93.766E)  Acute lateral meniscus tear of left knee, initial encounter (A39.939V)    Date of Onset: 5/24/2018        Next MD visit: 6/2 for Home use      Plan: Continue PT     Charges: EX 3      Total Timed Treatment: 45 min  Total Treatment Time: 60 min

## 2018-06-27 ENCOUNTER — APPOINTMENT (OUTPATIENT)
Dept: PHYSICAL THERAPY | Age: 28
End: 2018-06-27
Attending: ORTHOPAEDIC SURGERY
Payer: COMMERCIAL

## 2018-06-28 ENCOUNTER — OFFICE VISIT (OUTPATIENT)
Dept: PHYSICAL THERAPY | Age: 28
End: 2018-06-28
Attending: ORTHOPAEDIC SURGERY
Payer: COMMERCIAL

## 2018-06-28 PROCEDURE — 97014 ELECTRIC STIMULATION THERAPY: CPT | Performed by: PHYSICAL THERAPIST

## 2018-06-28 PROCEDURE — 97110 THERAPEUTIC EXERCISES: CPT | Performed by: PHYSICAL THERAPIST

## 2018-06-28 NOTE — PROGRESS NOTES
Diagnosis: New ACL tear, left, initial encounter (R55.874F)  Acute medial meniscus tear of left knee, initial encounter (X72.241M)  Acute lateral meniscus tear of left knee, initial encounter (Q78.453C)    Date of Onset: 5/24/2018        Next MD visit: 6/2

## 2018-07-02 ENCOUNTER — APPOINTMENT (OUTPATIENT)
Dept: PHYSICAL THERAPY | Age: 28
End: 2018-07-02
Attending: ORTHOPAEDIC SURGERY
Payer: COMMERCIAL

## 2018-07-03 ENCOUNTER — OFFICE VISIT (OUTPATIENT)
Dept: PHYSICAL THERAPY | Age: 28
End: 2018-07-03
Attending: ORTHOPAEDIC SURGERY
Payer: COMMERCIAL

## 2018-07-03 ENCOUNTER — TELEPHONE (OUTPATIENT)
Dept: PHYSICAL THERAPY | Age: 28
End: 2018-07-03

## 2018-07-03 PROCEDURE — 97110 THERAPEUTIC EXERCISES: CPT

## 2018-07-03 NOTE — PROGRESS NOTES
Diagnosis: New ACL tear, left, initial encounter (T16.303O)  Acute medial meniscus tear of left knee, initial encounter (Y59.522Z)  Acute lateral meniscus tear of left knee, initial encounter (H58.590Z)    Date of Onset: 5/24/2018        Next MD visit: 8/2 Assessment: Advanced exercises per protocol without any pain noted throughout duration of session. Patient needed occasional cues to perform exercises more slowly.        Plan: Continue PT     Charges: EX 3      Total Timed Treatment:

## 2018-07-05 ENCOUNTER — OFFICE VISIT (OUTPATIENT)
Dept: PHYSICAL THERAPY | Age: 28
End: 2018-07-05
Attending: ORTHOPAEDIC SURGERY
Payer: COMMERCIAL

## 2018-07-05 DIAGNOSIS — S83.282A ACUTE LATERAL MENISCUS TEAR OF LEFT KNEE, INITIAL ENCOUNTER: ICD-10-CM

## 2018-07-05 DIAGNOSIS — S83.512A NEW ACL TEAR, LEFT, INITIAL ENCOUNTER: ICD-10-CM

## 2018-07-05 DIAGNOSIS — S83.242A ACUTE MEDIAL MENISCUS TEAR OF LEFT KNEE, INITIAL ENCOUNTER: ICD-10-CM

## 2018-07-05 PROCEDURE — 97110 THERAPEUTIC EXERCISES: CPT | Performed by: PHYSICAL THERAPIST

## 2018-07-05 NOTE — PROGRESS NOTES
Diagnosis: New ACL tear, left, initial encounter (N78.972A)  Acute medial meniscus tear of left knee, initial encounter (I48.011A)  Acute lateral meniscus tear of left knee, initial encounter (K71.283R)    Date of Onset: 5/24/2018        Next MD visit: 8/2 progressing very well and demonstrates improved LE strength.       Plan: Continue PT     Charges: EX 3      Total Timed Treatment: 45 min  Total Treatment Time: 50 min

## 2018-07-09 ENCOUNTER — APPOINTMENT (OUTPATIENT)
Dept: PHYSICAL THERAPY | Age: 28
End: 2018-07-09
Attending: ORTHOPAEDIC SURGERY
Payer: COMMERCIAL

## 2018-07-10 ENCOUNTER — OFFICE VISIT (OUTPATIENT)
Dept: PHYSICAL THERAPY | Age: 28
End: 2018-07-10
Attending: ORTHOPAEDIC SURGERY
Payer: COMMERCIAL

## 2018-07-10 DIAGNOSIS — S83.242A ACUTE MEDIAL MENISCUS TEAR OF LEFT KNEE, INITIAL ENCOUNTER: ICD-10-CM

## 2018-07-10 DIAGNOSIS — S83.512A NEW ACL TEAR, LEFT, INITIAL ENCOUNTER: ICD-10-CM

## 2018-07-10 DIAGNOSIS — S83.282A ACUTE LATERAL MENISCUS TEAR OF LEFT KNEE, INITIAL ENCOUNTER: ICD-10-CM

## 2018-07-10 PROCEDURE — 97110 THERAPEUTIC EXERCISES: CPT

## 2018-07-10 NOTE — PROGRESS NOTES
Diagnosis: New ACL tear, left, initial encounter (P01.644A)  Acute medial meniscus tear of left knee, initial encounter (G51.293R)  Acute lateral meniscus tear of left knee, initial encounter (R80.986J)    Date of Onset: 5/24/2018        Next MD visit: 8/2 Assessment: Advanced strengthening exercises initiating monster walk with t-band and progressed reps of current exercises w/o pain only muscle fatigue.        Plan: Continue PT     Charges: EX 3      Total Timed Treatment: 49 min  Total Treatment Ti

## 2018-07-12 ENCOUNTER — OFFICE VISIT (OUTPATIENT)
Dept: PHYSICAL THERAPY | Age: 28
End: 2018-07-12
Attending: ORTHOPAEDIC SURGERY
Payer: COMMERCIAL

## 2018-07-12 PROCEDURE — 97110 THERAPEUTIC EXERCISES: CPT

## 2018-07-12 NOTE — PROGRESS NOTES
Diagnosis: New ACL tear, left, initial encounter (E05.678E)  Acute medial meniscus tear of left knee, initial encounter (E40.364T)  Acute lateral meniscus tear of left knee, initial encounter (W71.049S)    Date of Onset: 5/24/2018        Next MD visit: 8/2 Advanced reps and resistance of current exercises w/o pain noted.        Plan: Continue PT     Charges: EX 3      Total Timed Treatment: 50 min  Total Treatment Time: 55 min

## 2018-07-17 ENCOUNTER — OFFICE VISIT (OUTPATIENT)
Dept: PHYSICAL THERAPY | Age: 28
End: 2018-07-17
Attending: ORTHOPAEDIC SURGERY
Payer: COMMERCIAL

## 2018-07-17 PROCEDURE — 97110 THERAPEUTIC EXERCISES: CPT

## 2018-07-17 NOTE — PROGRESS NOTES
Diagnosis: New ACL tear, left, initial encounter (J31.690T)  Acute medial meniscus tear of left knee, initial encounter (X24.301D)  Acute lateral meniscus tear of left knee, initial encounter (W78.592T)    Date of Onset: 5/24/2018        Next MD visit: 8/2 Treatment: 40 min  Total Treatment Time: 45 min

## 2018-07-19 ENCOUNTER — OFFICE VISIT (OUTPATIENT)
Dept: PHYSICAL THERAPY | Age: 28
End: 2018-07-19
Attending: FAMILY MEDICINE
Payer: COMMERCIAL

## 2018-07-19 ENCOUNTER — TELEPHONE (OUTPATIENT)
Dept: PHYSICAL THERAPY | Age: 28
End: 2018-07-19

## 2018-07-19 PROCEDURE — 97110 THERAPEUTIC EXERCISES: CPT

## 2018-07-24 ENCOUNTER — OFFICE VISIT (OUTPATIENT)
Dept: PHYSICAL THERAPY | Age: 28
End: 2018-07-24
Attending: ORTHOPAEDIC SURGERY
Payer: COMMERCIAL

## 2018-07-24 PROCEDURE — 97110 THERAPEUTIC EXERCISES: CPT

## 2018-07-24 NOTE — PROGRESS NOTES
Diagnosis: New ACL tear, left, initial encounter (D06.748F)  Acute medial meniscus tear of left knee, initial encounter (S67.932N)  Acute lateral meniscus tear of left knee, initial encounter (R18.088J)    Date of Onset: 5/24/2018        Next MD visit: 8/2 30 ft x 2 each  - monster walk with black theraband around knees 30 ft x 2    - mini-squats with black t-band above knee 3 x 15 reps                             Assessment: Patient demo'd improved L L/E strength grossly.  Advanced exercises to address defic

## 2018-07-26 ENCOUNTER — OFFICE VISIT (OUTPATIENT)
Dept: PHYSICAL THERAPY | Age: 28
End: 2018-07-26
Attending: ORTHOPAEDIC SURGERY
Payer: COMMERCIAL

## 2018-07-26 PROCEDURE — 97110 THERAPEUTIC EXERCISES: CPT | Performed by: PHYSICAL THERAPIST

## 2018-07-26 NOTE — PROGRESS NOTES
Diagnosis: New ACL tear, left, initial encounter (P12.484F)  Acute medial meniscus tear of left knee, initial encounter (R41.772D)  Acute lateral meniscus tear of left knee, initial encounter (E03.277G)    Date of Onset: 5/24/2018        Next MD visit: 8/2

## 2018-07-31 ENCOUNTER — OFFICE VISIT (OUTPATIENT)
Dept: PHYSICAL THERAPY | Age: 28
End: 2018-07-31
Attending: ORTHOPAEDIC SURGERY
Payer: COMMERCIAL

## 2018-07-31 ENCOUNTER — TELEPHONE (OUTPATIENT)
Dept: PHYSICAL THERAPY | Age: 28
End: 2018-07-31

## 2018-07-31 ENCOUNTER — APPOINTMENT (OUTPATIENT)
Dept: PHYSICAL THERAPY | Age: 28
End: 2018-07-31
Attending: ORTHOPAEDIC SURGERY
Payer: COMMERCIAL

## 2018-07-31 PROCEDURE — 97110 THERAPEUTIC EXERCISES: CPT

## 2018-07-31 NOTE — PROGRESS NOTES
Diagnosis: New ACL tear, left, initial encounter (I16.793A)  Acute medial meniscus tear of left knee, initial encounter (O69.999O)  Acute lateral meniscus tear of left knee, initial encounter (D63.793E)    Date of Onset: 5/24/2018        Next MD visit: 8/2 without any pain noted only muscle fatigue.        Plan: Continue PT     Charges: EX 3      Total Timed Treatment: 45 min  Total Treatment Time: 45 min

## 2018-08-02 ENCOUNTER — OFFICE VISIT (OUTPATIENT)
Dept: PHYSICAL THERAPY | Age: 28
End: 2018-08-02
Attending: FAMILY MEDICINE
Payer: COMMERCIAL

## 2018-08-02 PROCEDURE — 97110 THERAPEUTIC EXERCISES: CPT | Performed by: PHYSICAL THERAPIST

## 2018-08-02 NOTE — PROGRESS NOTES
Diagnosis: New ACL tear, left, initial encounter (Z90.941K)  Acute medial meniscus tear of left knee, initial encounter (K75.037K)  Acute lateral meniscus tear of left knee, initial encounter (G02.296B)    Date of Onset: 5/24/2018        Next MD visit: 8/2

## 2018-08-07 ENCOUNTER — OFFICE VISIT (OUTPATIENT)
Dept: PHYSICAL THERAPY | Age: 28
End: 2018-08-07
Attending: FAMILY MEDICINE
Payer: COMMERCIAL

## 2018-08-07 PROCEDURE — 97110 THERAPEUTIC EXERCISES: CPT | Performed by: PHYSICAL THERAPIST

## 2018-08-07 NOTE — PROGRESS NOTES
Diagnosis: New ACL tear, left, initial encounter (A09.494S)  Acute medial meniscus tear of left knee, initial encounter (W09.713W)  Acute lateral meniscus tear of left knee, initial encounter (B88.715O)    Date of Onset: 5/24/2018        Next MD visit: 8/2

## 2018-08-09 ENCOUNTER — TELEPHONE (OUTPATIENT)
Dept: PHYSICAL THERAPY | Age: 28
End: 2018-08-09

## 2018-08-09 ENCOUNTER — APPOINTMENT (OUTPATIENT)
Dept: PHYSICAL THERAPY | Age: 28
End: 2018-08-09
Attending: FAMILY MEDICINE
Payer: COMMERCIAL

## 2018-08-09 ENCOUNTER — OFFICE VISIT (OUTPATIENT)
Dept: PHYSICAL THERAPY | Age: 28
End: 2018-08-09
Attending: FAMILY MEDICINE
Payer: COMMERCIAL

## 2018-08-09 PROCEDURE — 97110 THERAPEUTIC EXERCISES: CPT

## 2018-08-09 NOTE — PROGRESS NOTES
Diagnosis: New ACL tear, left, initial encounter (V41.222H)  Acute medial meniscus tear of left knee, initial encounter (E24.070J)  Acute lateral meniscus tear of left knee, initial encounter (C05.877K)    Date of Onset: 5/24/2018        Next MD visit: 8/2 noted with exercises and no pain post session only muscle fatigue.        Plan: Continue PT       Charges: EX 3      Total Timed Treatment: 45 min  Total Treatment Time: 55 min

## 2018-08-14 ENCOUNTER — TELEPHONE (OUTPATIENT)
Dept: PHYSICAL THERAPY | Age: 28
End: 2018-08-14

## 2018-08-14 ENCOUNTER — APPOINTMENT (OUTPATIENT)
Dept: PHYSICAL THERAPY | Age: 28
End: 2018-08-14
Attending: FAMILY MEDICINE
Payer: COMMERCIAL

## 2018-08-16 ENCOUNTER — OFFICE VISIT (OUTPATIENT)
Dept: PHYSICAL THERAPY | Age: 28
End: 2018-08-16
Attending: FAMILY MEDICINE
Payer: COMMERCIAL

## 2018-08-16 PROCEDURE — 97110 THERAPEUTIC EXERCISES: CPT

## 2018-08-16 NOTE — PROGRESS NOTES
Diagnosis: New ACL tear, left, initial encounter (R51.614G)  Acute medial meniscus tear of left knee, initial encounter (C48.802N)  Acute lateral meniscus tear of left knee, initial encounter (L11.259I)    Date of Onset: 5/24/2018        Next MD visit: 8/2 min  Total Treatment Time: 45 min

## 2018-08-21 ENCOUNTER — TELEPHONE (OUTPATIENT)
Dept: PHYSICAL THERAPY | Age: 28
End: 2018-08-21

## 2018-08-21 ENCOUNTER — APPOINTMENT (OUTPATIENT)
Dept: PHYSICAL THERAPY | Age: 28
End: 2018-08-21
Attending: FAMILY MEDICINE
Payer: COMMERCIAL

## 2018-08-23 ENCOUNTER — OFFICE VISIT (OUTPATIENT)
Dept: PHYSICAL THERAPY | Age: 28
End: 2018-08-23
Attending: ORTHOPAEDIC SURGERY
Payer: COMMERCIAL

## 2018-08-23 ENCOUNTER — PATIENT MESSAGE (OUTPATIENT)
Dept: FAMILY MEDICINE CLINIC | Facility: CLINIC | Age: 28
End: 2018-08-23

## 2018-08-23 ENCOUNTER — APPOINTMENT (OUTPATIENT)
Dept: PHYSICAL THERAPY | Age: 28
End: 2018-08-23
Attending: FAMILY MEDICINE
Payer: COMMERCIAL

## 2018-08-23 PROCEDURE — 97110 THERAPEUTIC EXERCISES: CPT

## 2018-08-23 NOTE — TELEPHONE ENCOUNTER
From: Fabiano Esquivel  To: Saintclair Pila, DO  Sent: 8/23/2018 12:24 PM CDT  Subject: Non-Urgent Medical Question    Hi Doctor, I was wondering who that was that you recommended to me to I can get my fungus issue taken care of with the laser treatment.  I've se

## 2018-08-23 NOTE — PROGRESS NOTES
Diagnosis: New ACL tear, left, initial encounter (K52.439V)  Acute medial meniscus tear of left knee, initial encounter (J48.566L)  Acute lateral meniscus tear of left knee, initial encounter (R17.376S)    Date of Onset: 5/24/2018        Next MD visit: 8/2 Plan: Continue PT. Patient to see MD tomorrow.        Charges: EX 3      Total Timed Treatment: 45 min  Total Treatment Time: 45 min

## 2018-08-24 ENCOUNTER — OFFICE VISIT (OUTPATIENT)
Dept: ORTHOPEDICS CLINIC | Facility: CLINIC | Age: 28
End: 2018-08-24
Payer: COMMERCIAL

## 2018-08-24 ENCOUNTER — HOSPITAL ENCOUNTER (OUTPATIENT)
Dept: GENERAL RADIOLOGY | Facility: HOSPITAL | Age: 28
Discharge: HOME | End: 2018-08-24
Attending: ORTHOPAEDIC SURGERY
Payer: COMMERCIAL

## 2018-08-24 DIAGNOSIS — S83.242A ACUTE MEDIAL MENISCUS TEAR OF LEFT KNEE, INITIAL ENCOUNTER: ICD-10-CM

## 2018-08-24 DIAGNOSIS — Z47.89 ORTHOPEDIC AFTERCARE: ICD-10-CM

## 2018-08-24 DIAGNOSIS — S83.282A ACUTE LATERAL MENISCUS TEAR OF LEFT KNEE, INITIAL ENCOUNTER: ICD-10-CM

## 2018-08-24 DIAGNOSIS — S83.512A NEW ACL TEAR, LEFT, INITIAL ENCOUNTER: Primary | ICD-10-CM

## 2018-08-24 PROCEDURE — 99212 OFFICE O/P EST SF 10 MIN: CPT | Performed by: ORTHOPAEDIC SURGERY

## 2018-08-24 NOTE — PROGRESS NOTES
This is a pleasant 25-year-old male that is 3 months status post left knee arthroscopy, ACL reconstruction with quadriceps tendon autograft, partial medial lateral meniscectomy. He has had no fevers, chills, chest pain, shortness of breath.   He comes in t

## 2018-08-27 ENCOUNTER — TELEPHONE (OUTPATIENT)
Dept: PHYSICAL THERAPY | Age: 28
End: 2018-08-27

## 2018-08-28 ENCOUNTER — OFFICE VISIT (OUTPATIENT)
Dept: PHYSICAL THERAPY | Age: 28
End: 2018-08-28
Attending: ORTHOPAEDIC SURGERY
Payer: COMMERCIAL

## 2018-08-28 PROCEDURE — 97110 THERAPEUTIC EXERCISES: CPT

## 2018-08-28 NOTE — PROGRESS NOTES
Diagnosis: New ACL tear, left, initial encounter (Z70.111Y)  Acute medial meniscus tear of left knee, initial encounter (A64.861I)  Acute lateral meniscus tear of left knee, initial encounter (C19.016Y)    Date of Onset: 5/24/2018        Next MD visit: 11/

## 2018-09-04 ENCOUNTER — APPOINTMENT (OUTPATIENT)
Dept: PHYSICAL THERAPY | Age: 28
End: 2018-09-04
Attending: ORTHOPAEDIC SURGERY
Payer: COMMERCIAL

## 2018-09-04 ENCOUNTER — TELEPHONE (OUTPATIENT)
Dept: PHYSICAL THERAPY | Age: 28
End: 2018-09-04

## 2018-09-10 ENCOUNTER — APPOINTMENT (OUTPATIENT)
Dept: PHYSICAL THERAPY | Age: 28
End: 2018-09-10
Attending: ORTHOPAEDIC SURGERY
Payer: COMMERCIAL

## 2018-09-11 ENCOUNTER — OFFICE VISIT (OUTPATIENT)
Dept: PHYSICAL THERAPY | Age: 28
End: 2018-09-11
Attending: ORTHOPAEDIC SURGERY
Payer: COMMERCIAL

## 2018-09-11 PROCEDURE — 97110 THERAPEUTIC EXERCISES: CPT

## 2018-09-11 NOTE — PROGRESS NOTES
Diagnosis: New ACL tear, left, initial encounter (T47.134E)  Acute medial meniscus tear of left knee, initial encounter (M10.419G)  Acute lateral meniscus tear of left knee, initial encounter (U17.008E)    Date of Onset: 5/24/2018        Next MD visit: 11/

## 2018-09-17 ENCOUNTER — APPOINTMENT (OUTPATIENT)
Dept: PHYSICAL THERAPY | Age: 28
End: 2018-09-17
Attending: ORTHOPAEDIC SURGERY
Payer: COMMERCIAL

## 2018-09-18 ENCOUNTER — OFFICE VISIT (OUTPATIENT)
Dept: PHYSICAL THERAPY | Age: 28
End: 2018-09-18
Attending: ORTHOPAEDIC SURGERY
Payer: COMMERCIAL

## 2018-09-18 PROCEDURE — 97110 THERAPEUTIC EXERCISES: CPT

## 2018-09-18 NOTE — PROGRESS NOTES
Diagnosis: New ACL tear, left, initial encounter (L41.585L)  Acute medial meniscus tear of left knee, initial encounter (Y36.654D)  Acute lateral meniscus tear of left knee, initial encounter (C12.142N)    Date of Onset: 5/24/2018        Next MD visit: 11/ Plan: Continue PT.       Charges: EX 3      Total Timed Treatment: 45 min  Total Treatment Time: 45 min

## 2018-09-24 ENCOUNTER — APPOINTMENT (OUTPATIENT)
Dept: PHYSICAL THERAPY | Age: 28
End: 2018-09-24
Attending: ORTHOPAEDIC SURGERY
Payer: COMMERCIAL

## 2018-09-25 ENCOUNTER — OFFICE VISIT (OUTPATIENT)
Dept: PHYSICAL THERAPY | Age: 28
End: 2018-09-25
Attending: ORTHOPAEDIC SURGERY
Payer: COMMERCIAL

## 2018-09-25 PROCEDURE — 97110 THERAPEUTIC EXERCISES: CPT

## 2018-09-25 NOTE — PROGRESS NOTES
Diagnosis: New ACL tear, left, initial encounter (Y56.274O)  Acute medial meniscus tear of left knee, initial encounter (W69.024B)  Acute lateral meniscus tear of left knee, initial encounter (O43.198Z)    Date of Onset: 5/24/2018        Next MD visit: 10/ per protocol without pain noted only muscle fatigue. Plan: Continue PT.       Charges: EX 2      Total Timed Treatment: 30 min  Total Treatment Time: 30 min

## 2018-09-26 ENCOUNTER — APPOINTMENT (OUTPATIENT)
Dept: PHYSICAL THERAPY | Age: 28
End: 2018-09-26
Attending: ORTHOPAEDIC SURGERY
Payer: COMMERCIAL

## 2018-09-27 ENCOUNTER — APPOINTMENT (OUTPATIENT)
Dept: PHYSICAL THERAPY | Age: 28
End: 2018-09-27
Attending: ORTHOPAEDIC SURGERY
Payer: COMMERCIAL

## 2018-10-08 ENCOUNTER — APPOINTMENT (OUTPATIENT)
Dept: PHYSICAL THERAPY | Age: 28
End: 2018-10-08
Attending: ORTHOPAEDIC SURGERY
Payer: COMMERCIAL

## 2018-10-09 ENCOUNTER — OFFICE VISIT (OUTPATIENT)
Dept: PHYSICAL THERAPY | Age: 28
End: 2018-10-09
Attending: ORTHOPAEDIC SURGERY
Payer: COMMERCIAL

## 2018-10-09 PROCEDURE — 97110 THERAPEUTIC EXERCISES: CPT

## 2018-10-09 NOTE — PROGRESS NOTES
Diagnosis: New ACL tear, left, initial encounter (P45.514N)  Acute medial meniscus tear of left knee, initial encounter (P10.984D)  Acute lateral meniscus tear of left knee, initial encounter (K64.755I)    Date of Onset: 5/24/2018        Next MD visit: 10/ extension/abduciton/adduction/flexion 3 x 10 each with black t-band @ ankle   - L L/E shuttle knee extension 7 bands 3 x 10 with black t-band outside of knee  (setting 6)  - B L/E shuttle knee extension 8 bands 3 x 10 reps with black t-band above knees (se

## 2018-10-16 ENCOUNTER — OFFICE VISIT (OUTPATIENT)
Dept: PHYSICAL THERAPY | Age: 28
End: 2018-10-16
Attending: ORTHOPAEDIC SURGERY
Payer: COMMERCIAL

## 2018-10-16 NOTE — PROGRESS NOTES
Diagnosis: New ACL tear, left, initial encounter (L52.207N)  Acute medial meniscus tear of left knee, initial encounter (P18.134I)  Acute lateral meniscus tear of left knee, initial encounter (J14.129D)    Date of Onset: 5/24/2018        Next MD visit: 10/

## 2018-10-25 ENCOUNTER — OFFICE VISIT (OUTPATIENT)
Dept: PODIATRY CLINIC | Facility: CLINIC | Age: 28
End: 2018-10-25
Payer: COMMERCIAL

## 2018-10-25 ENCOUNTER — APPOINTMENT (OUTPATIENT)
Dept: LAB | Age: 28
End: 2018-10-25
Attending: PODIATRIST
Payer: COMMERCIAL

## 2018-10-25 DIAGNOSIS — B35.1 ONYCHOMYCOSIS: Primary | ICD-10-CM

## 2018-10-25 DIAGNOSIS — B35.1 ONYCHOMYCOSIS: ICD-10-CM

## 2018-10-25 PROCEDURE — 36415 COLL VENOUS BLD VENIPUNCTURE: CPT

## 2018-10-25 PROCEDURE — 99212 OFFICE O/P EST SF 10 MIN: CPT | Performed by: PODIATRIST

## 2018-10-25 PROCEDURE — 99242 OFF/OP CONSLTJ NEW/EST SF 20: CPT | Performed by: PODIATRIST

## 2018-10-25 PROCEDURE — 80076 HEPATIC FUNCTION PANEL: CPT

## 2018-10-25 NOTE — PROGRESS NOTES
HPI:    Patient ID: Katherine Larios is a 32year old male. This 59-year-old male presents as a new patient to me on consult from [address] Since chief concern is toenail fungus. He has had it for a while.   He has had success with an oral antifungal medicat medication         ASSESSMENT/PLAN:   Onychomycosis  (primary encounter diagnosis)    Orders Placed This Encounter      Hepatic Function Panel (7) [E]      Meds This Visit:  Requested Prescriptions      No prescriptions requested or ordered in this encount

## 2018-10-26 ENCOUNTER — HOSPITAL ENCOUNTER (OUTPATIENT)
Dept: GENERAL RADIOLOGY | Facility: HOSPITAL | Age: 28
Discharge: HOME | End: 2018-10-26
Attending: ORTHOPAEDIC SURGERY
Payer: COMMERCIAL

## 2018-10-26 ENCOUNTER — OFFICE VISIT (OUTPATIENT)
Dept: FAMILY MEDICINE CLINIC | Facility: CLINIC | Age: 28
End: 2018-10-26
Payer: COMMERCIAL

## 2018-10-26 ENCOUNTER — OFFICE VISIT (OUTPATIENT)
Dept: ORTHOPEDICS CLINIC | Facility: CLINIC | Age: 28
End: 2018-10-26
Payer: COMMERCIAL

## 2018-10-26 ENCOUNTER — LAB ENCOUNTER (OUTPATIENT)
Dept: LAB | Age: 28
End: 2018-10-26
Attending: FAMILY MEDICINE
Payer: COMMERCIAL

## 2018-10-26 VITALS
HEART RATE: 53 BPM | TEMPERATURE: 98 F | SYSTOLIC BLOOD PRESSURE: 101 MMHG | WEIGHT: 199 LBS | DIASTOLIC BLOOD PRESSURE: 61 MMHG | BODY MASS INDEX: 29.47 KG/M2 | HEIGHT: 69 IN

## 2018-10-26 DIAGNOSIS — S83.282A ACUTE LATERAL MENISCUS TEAR OF LEFT KNEE, INITIAL ENCOUNTER: ICD-10-CM

## 2018-10-26 DIAGNOSIS — M25.562 ACUTE PAIN OF LEFT KNEE: Primary | ICD-10-CM

## 2018-10-26 DIAGNOSIS — S83.242A ACUTE MEDIAL MENISCUS TEAR OF LEFT KNEE, INITIAL ENCOUNTER: ICD-10-CM

## 2018-10-26 DIAGNOSIS — J30.89 OTHER ALLERGIC RHINITIS: ICD-10-CM

## 2018-10-26 DIAGNOSIS — Z11.3 SCREEN FOR STD (SEXUALLY TRANSMITTED DISEASE): ICD-10-CM

## 2018-10-26 DIAGNOSIS — Z00.00 ADULT GENERAL MEDICAL EXAM: Primary | ICD-10-CM

## 2018-10-26 DIAGNOSIS — Z23 NEED FOR VACCINATION: ICD-10-CM

## 2018-10-26 DIAGNOSIS — Z00.00 ADULT GENERAL MEDICAL EXAM: ICD-10-CM

## 2018-10-26 DIAGNOSIS — M25.562 ACUTE PAIN OF LEFT KNEE: ICD-10-CM

## 2018-10-26 DIAGNOSIS — S83.512A NEW ACL TEAR, LEFT, INITIAL ENCOUNTER: ICD-10-CM

## 2018-10-26 PROCEDURE — 90686 IIV4 VACC NO PRSV 0.5 ML IM: CPT | Performed by: FAMILY MEDICINE

## 2018-10-26 PROCEDURE — 90472 IMMUNIZATION ADMIN EACH ADD: CPT | Performed by: FAMILY MEDICINE

## 2018-10-26 PROCEDURE — 99395 PREV VISIT EST AGE 18-39: CPT | Performed by: FAMILY MEDICINE

## 2018-10-26 PROCEDURE — 90471 IMMUNIZATION ADMIN: CPT | Performed by: FAMILY MEDICINE

## 2018-10-26 PROCEDURE — 87591 N.GONORRHOEAE DNA AMP PROB: CPT

## 2018-10-26 PROCEDURE — 84443 ASSAY THYROID STIM HORMONE: CPT

## 2018-10-26 PROCEDURE — 80048 BASIC METABOLIC PNL TOTAL CA: CPT

## 2018-10-26 PROCEDURE — 90715 TDAP VACCINE 7 YRS/> IM: CPT | Performed by: FAMILY MEDICINE

## 2018-10-26 PROCEDURE — 80061 LIPID PANEL: CPT

## 2018-10-26 PROCEDURE — 87491 CHLMYD TRACH DNA AMP PROBE: CPT

## 2018-10-26 PROCEDURE — 99213 OFFICE O/P EST LOW 20 MIN: CPT | Performed by: ORTHOPAEDIC SURGERY

## 2018-10-26 PROCEDURE — 85025 COMPLETE CBC W/AUTO DIFF WBC: CPT

## 2018-10-26 PROCEDURE — 99212 OFFICE O/P EST SF 10 MIN: CPT | Performed by: ORTHOPAEDIC SURGERY

## 2018-10-26 PROCEDURE — 73564 X-RAY EXAM KNEE 4 OR MORE: CPT | Performed by: ORTHOPAEDIC SURGERY

## 2018-10-26 PROCEDURE — 36415 COLL VENOUS BLD VENIPUNCTURE: CPT

## 2018-10-26 RX ORDER — TERBINAFINE HYDROCHLORIDE 250 MG/1
250 TABLET ORAL DAILY
Qty: 90 TABLET | Refills: 0 | Status: SHIPPED | OUTPATIENT
Start: 2018-10-26 | End: 2019-03-26 | Stop reason: ALTCHOICE

## 2018-10-26 RX ORDER — FLUTICASONE PROPIONATE 50 MCG
2 SPRAY, SUSPENSION (ML) NASAL DAILY
Qty: 3 BOTTLE | Refills: 1 | Status: SHIPPED | OUTPATIENT
Start: 2018-10-26 | End: 2019-02-23

## 2018-10-26 NOTE — TELEPHONE ENCOUNTER
----- Message from Serg Roberts DPM sent at 10/26/2018  7:17 AM CDT -----  1. Call patient  2. Labs wnl  3. Rx Lamisil 250 mg tabs             #90         Sig 1 tab daily       Generic ok nrf  4.  Follow up 2 months

## 2018-10-26 NOTE — PROGRESS NOTES
Patient ID: Lu Rodriguez is a 32year old male. HPI  Patient presents with:  Physical  STD    He delivers medical clinic to hospitalist.  He does not smoke. He is single but dating. He is been with the same partner for about 3 months now.     He was to for: IRON, IRONTOT, TIBC, IRONSAT, TRANSFERRIN, TIBCP, IRONBIND, SAT, SATUR    No results found for: ESSIE    No results found for: VITD, QVITD, VITD25, PCYS22SK  No results found for: PSA, QPSA, TOTPSASCREEN    ============================================== History    Socioeconomic History      Marital status: Single      Spouse name: Not on file      Number of children: Not on file      Years of education: Not on file      Highest education level: Not on file    Social Needs      Financial resource strain: N Normal range of motion. Neck supple. No thyromegaly present. Cardiovascular: Normal rate, regular rhythm and no  murmur heard. Pulmonary/Chest: Effort normal and breath sounds normal. No respiratory distress. Abdominal: Soft.  Bowel sounds are normal. treatment discussed and patient/family member understands and agrees to plan. No Follow-up on file. Bisi Zhang DO  10/26/2018      .

## 2018-10-26 NOTE — TELEPHONE ENCOUNTER
Call to [facility] . No answerl Left voice message about lab results and prescription from Dr. Mckayla Helm.  REquested call back

## 2018-10-26 NOTE — PROGRESS NOTES
This is a pleasant 43-year-old male that is 5 months status post left knee ACL reconstruction and partial medial lateral meniscectomy. Patient reports that 2 weeks ago when he was performing an exercise which consisted of abdominal rolling.   Patient repor

## 2018-10-26 NOTE — TELEPHONE ENCOUNTER
----- Message from Vazquez Ruiz DPM sent at 10/26/2018  7:17 AM CDT -----  1. Call patient  2. Labs wnl  3. Rx Lamisil 250 mg tabs             #90         Sig 1 tab daily       Generic ok nrf  4.  Follow up 2 months

## 2018-10-30 ENCOUNTER — PATIENT MESSAGE (OUTPATIENT)
Dept: FAMILY MEDICINE CLINIC | Facility: CLINIC | Age: 28
End: 2018-10-30

## 2018-10-30 ENCOUNTER — TELEPHONE (OUTPATIENT)
Dept: PHYSICAL THERAPY | Age: 28
End: 2018-10-30

## 2018-10-30 ENCOUNTER — APPOINTMENT (OUTPATIENT)
Dept: PHYSICAL THERAPY | Age: 28
End: 2018-10-30
Attending: ORTHOPAEDIC SURGERY
Payer: COMMERCIAL

## 2018-10-31 NOTE — TELEPHONE ENCOUNTER
From: Edwar Granados  To: Cl De La Cruz DO  Sent: 10/30/2018 7:12 PM CDT  Subject: Non-Urgent Medical Question    Also I saw the std check and nice, was wondering if all that was checked was the gonorrhea and syphilis?

## 2018-11-08 ENCOUNTER — TELEPHONE (OUTPATIENT)
Dept: PHYSICAL THERAPY | Age: 28
End: 2018-11-08

## 2018-11-13 ENCOUNTER — OFFICE VISIT (OUTPATIENT)
Dept: PHYSICAL THERAPY | Age: 28
End: 2018-11-13
Attending: ORTHOPAEDIC SURGERY
Payer: COMMERCIAL

## 2018-11-13 PROCEDURE — 97110 THERAPEUTIC EXERCISES: CPT

## 2018-11-13 NOTE — PROGRESS NOTES
Diagnosis: New ACL tear, left, initial encounter (Q16.882V)  Acute medial meniscus tear of left knee, initial encounter (P94.518V)  Acute lateral meniscus tear of left knee, initial encounter (I12.952J)    Date of Onset: 5/24/2018        Next MD visit: 10/ retro lunge with black t-band above L L/E 3 x 10  - L RDL with with black t-band above L L/E 3 x 10 with 10# DB L U/E  - sidestep squat 1 x 30' each  - R/L forward walking lunge 2 x 30' each  - lateral shuffling 2 x 30' each   - supine L SLR 3 x 30 with bl

## 2018-11-20 ENCOUNTER — OFFICE VISIT (OUTPATIENT)
Dept: PHYSICAL THERAPY | Age: 28
End: 2018-11-20
Attending: ORTHOPAEDIC SURGERY
Payer: COMMERCIAL

## 2018-11-20 PROCEDURE — 97110 THERAPEUTIC EXERCISES: CPT

## 2018-11-20 NOTE — PROGRESS NOTES
Diagnosis: New ACL tear, left, initial encounter (F47.360K)  Acute medial meniscus tear of left knee, initial encounter (Z55.968J)  Acute lateral meniscus tear of left knee, initial encounter (G64.165J)    Date of Onset: 5/24/2018        Next MD visit: 11/ box hops x 15 reps    - L L/E shuttle plyo jump 5 bands 3 x 10 (setting 5)  - rapid fwd step up/straddle rapid step up on 10\" box 1 minute each  - lateral shuffle over cones 3 x 25' each (7 cones)  - side/side, in/out, hop in/out with cones (7 cones) x 3 (setting 4)   - L SLS RDL resisted pulley straight bar 25# 2 x 10   - supine ice post session x 10 minutes                                    Assessment: Progressed exercises per protocol with no pain noted only muscle fatigue. Therapy Goals      1.  Pa

## 2018-11-27 ENCOUNTER — APPOINTMENT (OUTPATIENT)
Dept: PHYSICAL THERAPY | Age: 28
End: 2018-11-27
Attending: FAMILY MEDICINE
Payer: COMMERCIAL

## 2019-03-26 ENCOUNTER — OFFICE VISIT (OUTPATIENT)
Dept: PODIATRY CLINIC | Facility: CLINIC | Age: 29
End: 2019-03-26
Payer: COMMERCIAL

## 2019-03-26 DIAGNOSIS — B35.1 ONYCHOMYCOSIS: Primary | ICD-10-CM

## 2019-03-26 PROCEDURE — 99212 OFFICE O/P EST SF 10 MIN: CPT | Performed by: PODIATRIST

## 2019-03-26 PROCEDURE — 99213 OFFICE O/P EST LOW 20 MIN: CPT | Performed by: PODIATRIST

## 2019-03-26 RX ORDER — TERBINAFINE HYDROCHLORIDE 250 MG/1
250 TABLET ORAL DAILY
Qty: 90 TABLET | Refills: 0 | Status: SHIPPED | OUTPATIENT
Start: 2019-03-26 | End: 2019-12-02

## 2019-03-26 NOTE — PROGRESS NOTES
HPI:    Patient ID: Glen Ramirez is a 29year old male. HPI this 55-year-old male presents to discuss treatment associated with fungus toenails. He was very happy and had a sense of almost complete resolution of the condition.   He finished the 90 tablet

## 2019-04-25 ENCOUNTER — OFFICE VISIT (OUTPATIENT)
Dept: FAMILY MEDICINE CLINIC | Facility: CLINIC | Age: 29
End: 2019-04-25
Payer: COMMERCIAL

## 2019-04-25 ENCOUNTER — LAB ENCOUNTER (OUTPATIENT)
Dept: LAB | Age: 29
End: 2019-04-25
Attending: FAMILY MEDICINE
Payer: COMMERCIAL

## 2019-04-25 VITALS
DIASTOLIC BLOOD PRESSURE: 71 MMHG | HEIGHT: 69 IN | SYSTOLIC BLOOD PRESSURE: 118 MMHG | WEIGHT: 202 LBS | BODY MASS INDEX: 29.92 KG/M2 | TEMPERATURE: 97 F | HEART RATE: 60 BPM

## 2019-04-25 DIAGNOSIS — Z11.3 SCREEN FOR STD (SEXUALLY TRANSMITTED DISEASE): ICD-10-CM

## 2019-04-25 DIAGNOSIS — Z11.3 SCREEN FOR STD (SEXUALLY TRANSMITTED DISEASE): Primary | ICD-10-CM

## 2019-04-25 DIAGNOSIS — R10.33 PERIUMBILICAL ABDOMINAL PAIN: ICD-10-CM

## 2019-04-25 PROCEDURE — 87389 HIV-1 AG W/HIV-1&-2 AB AG IA: CPT

## 2019-04-25 PROCEDURE — 87491 CHLMYD TRACH DNA AMP PROBE: CPT

## 2019-04-25 PROCEDURE — 36415 COLL VENOUS BLD VENIPUNCTURE: CPT

## 2019-04-25 PROCEDURE — 99212 OFFICE O/P EST SF 10 MIN: CPT | Performed by: FAMILY MEDICINE

## 2019-04-25 PROCEDURE — 87591 N.GONORRHOEAE DNA AMP PROB: CPT

## 2019-04-25 PROCEDURE — 86694 HERPES SIMPLEX NES ANTBDY: CPT

## 2019-04-25 PROCEDURE — 99214 OFFICE O/P EST MOD 30 MIN: CPT | Performed by: FAMILY MEDICINE

## 2019-04-25 NOTE — PROGRESS NOTES
Patient ID: Aretha Redmond is a 29year old male. HPI  Patient presents with:  Testing: std  Stomach Pain    Has a new partner for past 3 months. Last intercourse was 3 week ago.  He currently has 2 partners which he has been seeing over the  past couple m mouth daily. Disp: 90 tablet Rfl: 0     Allergies:No Known Allergies   PHYSICAL EXAM:   Physical Exam  Blood pressure 118/71, pulse 60, temperature 97.1 °F (36.2 °C), temperature source Oral, height 5' 9\" (1.753 m), weight 202 lb (91.6 kg).   Physical Exam services described in this documentation. All medical record entries made by the scribe were at my direction and in my presence.   I have reviewed the chart and discharge instructions (if applicable) and agree that the record reflects my personal performanc

## 2019-05-02 ENCOUNTER — NURSE TRIAGE (OUTPATIENT)
Dept: OTHER | Age: 29
End: 2019-05-02

## 2019-05-02 NOTE — TELEPHONE ENCOUNTER
----- Message from Evelin Munoz sent at 5/2/2019  8:56 AM CDT -----  Regarding: Non-Urgent Medical Question  Contact: [phone]  Hi Dr Zachary Díaz,    My spine in my upper back has some pain. Do you recommend using a chiropractor?

## 2019-05-02 NOTE — TELEPHONE ENCOUNTER
Action Requested: Summary for Provider     []  Critical Lab, Recommendations Needed  [] Need Additional Advice  []   FYI    []   Need Orders  [] Need Medications Sent to Pharmacy  []  Other     SUMMARY: pt asking if doctor recommends he see a chiropractor

## 2019-05-07 ENCOUNTER — HOSPITAL ENCOUNTER (OUTPATIENT)
Dept: GENERAL RADIOLOGY | Age: 29
Discharge: HOME | End: 2019-05-07
Attending: FAMILY MEDICINE
Payer: COMMERCIAL

## 2019-05-07 ENCOUNTER — OFFICE VISIT (OUTPATIENT)
Dept: FAMILY MEDICINE CLINIC | Facility: CLINIC | Age: 29
End: 2019-05-07
Payer: COMMERCIAL

## 2019-05-07 VITALS
HEIGHT: 69 IN | DIASTOLIC BLOOD PRESSURE: 68 MMHG | TEMPERATURE: 97 F | SYSTOLIC BLOOD PRESSURE: 118 MMHG | HEART RATE: 53 BPM | BODY MASS INDEX: 29.47 KG/M2 | WEIGHT: 199 LBS

## 2019-05-07 DIAGNOSIS — G89.29 CHRONIC RIGHT-SIDED THORACIC BACK PAIN: ICD-10-CM

## 2019-05-07 DIAGNOSIS — M54.6 CHRONIC RIGHT-SIDED THORACIC BACK PAIN: ICD-10-CM

## 2019-05-07 DIAGNOSIS — M54.6 CHRONIC RIGHT-SIDED THORACIC BACK PAIN: Primary | ICD-10-CM

## 2019-05-07 DIAGNOSIS — G89.29 CHRONIC RIGHT-SIDED THORACIC BACK PAIN: Primary | ICD-10-CM

## 2019-05-07 PROCEDURE — 72072 X-RAY EXAM THORAC SPINE 3VWS: CPT | Performed by: FAMILY MEDICINE

## 2019-05-07 PROCEDURE — 99214 OFFICE O/P EST MOD 30 MIN: CPT | Performed by: FAMILY MEDICINE

## 2019-05-07 PROCEDURE — 99212 OFFICE O/P EST SF 10 MIN: CPT | Performed by: FAMILY MEDICINE

## 2019-05-07 RX ORDER — NABUMETONE 750 MG/1
750 TABLET, FILM COATED ORAL 2 TIMES DAILY
Qty: 60 TABLET | Refills: 1 | Status: SHIPPED | OUTPATIENT
Start: 2019-05-07 | End: 2019-12-02

## 2019-05-07 NOTE — PATIENT INSTRUCTIONS
[employer] Orb, Orb Extreme and Orb Extreme mini mobility massage ball. I bought the 3 inch one from [employer] and very helpful. When is very sore after days work go ahead and even put icepack on there is that should help.   I would take the R

## 2019-05-07 NOTE — PROGRESS NOTES
Patient ID: Jason Corrales is a 29year old male. HPI  Patient presents with:  Back Pain: mostly upper back    Back pain is at the right scapular area. Pain started a year and a half ago and has progressively become more bothersome.  Back pain only occurs daily. Disp: 90 tablet Rfl: 0     Allergies:No Known Allergies   PHYSICAL EXAM:   Physical Exam  Blood pressure 118/68, pulse 53, temperature 97.2 °F (36.2 °C), temperature source Oral, height 5' 9\" (1.753 m), weight 199 lb (90.3 kg).   Physical Exam   Con placed behind his back while driving. Referrals (if applicable)  Orders Placed This Encounter      Physical Therapy ([facility]) - [place] Locations          Order Comments:              Treatment: Evaluate & Treat and use Modalities if needed.   May bene

## 2019-06-04 ENCOUNTER — PATIENT MESSAGE (OUTPATIENT)
Dept: FAMILY MEDICINE CLINIC | Facility: CLINIC | Age: 29
End: 2019-06-04

## 2019-06-04 NOTE — TELEPHONE ENCOUNTER
From: Lu Rodriguez  To: Cornelius Simons DO  Sent: 6/4/2019 5:11 PM CDT  Subject: Non-Urgent Medical Question    Hi doctor, from the recent std check that you gave me, does that include warts?

## 2019-06-04 NOTE — TELEPHONE ENCOUNTER
Let him know there is no test for genital warts. He would see small cauliflower warts on the penile area and groin area if he has them.

## 2019-06-05 ENCOUNTER — TELEPHONE (OUTPATIENT)
Dept: FAMILY MEDICINE CLINIC | Facility: CLINIC | Age: 29
End: 2019-06-05

## 2019-06-05 NOTE — TELEPHONE ENCOUNTER
Patient aware of message, no further questions at this time. John Hanks RN 15 hours ago (7:05 PM)         [place]- transfer to triage. Email sent to patient with Dr Royal Began response.           Documentation       CRUZ Maguirea

## 2019-06-05 NOTE — TELEPHONE ENCOUNTER
Regarding: RE: Non-Urgent Medical Question  Contact: [phone]      ----- Message -----  From: Juan A Mccormack RN  Sent: 6/4/2019   6:37 PM  To:  Tuan Dixon DO  Subject: RE: Non-Urgent Medical Question                  ----- Message from Yancy Hobson RN

## 2019-06-13 ENCOUNTER — OFFICE VISIT (OUTPATIENT)
Dept: PHYSICAL THERAPY | Age: 29
End: 2019-06-13
Attending: FAMILY MEDICINE
Payer: COMMERCIAL

## 2019-06-13 DIAGNOSIS — M54.6 CHRONIC RIGHT-SIDED THORACIC BACK PAIN: ICD-10-CM

## 2019-06-13 DIAGNOSIS — G89.29 CHRONIC RIGHT-SIDED THORACIC BACK PAIN: ICD-10-CM

## 2019-06-13 PROCEDURE — 97110 THERAPEUTIC EXERCISES: CPT | Performed by: PHYSICAL THERAPIST

## 2019-06-13 PROCEDURE — 97161 PT EVAL LOW COMPLEX 20 MIN: CPT | Performed by: PHYSICAL THERAPIST

## 2019-06-13 NOTE — PROGRESS NOTES
P.T. EVALUATION:   Referring Physician: Dr. Gerald Lion  Diagnosis: Chronic right-sided thoracic back pain (M54.6,G89.29)    Date of Onset: 5/7/2019 Date of Service: 6/13/2019     PATIENT SUMMARY   Lu Rodriguez is a 29year old y/o male who presents to therapy Walk independently without a device. Does not demonstrate any major gait deviations. Today’s Treatment and Response:  Patient education provided on PT eval findings, treatment plan, goals, HEP.   Patient received today:  PT Eval Low Complexity,  Therapeu care.      X___________________________________________________ Date____________________    Certification From: 1/67/6854  To:9/11/2019

## 2019-06-18 ENCOUNTER — OFFICE VISIT (OUTPATIENT)
Dept: PHYSICAL THERAPY | Age: 29
End: 2019-06-18
Attending: FAMILY MEDICINE
Payer: COMMERCIAL

## 2019-06-18 DIAGNOSIS — M54.6 CHRONIC RIGHT-SIDED THORACIC BACK PAIN: ICD-10-CM

## 2019-06-18 DIAGNOSIS — G89.29 CHRONIC RIGHT-SIDED THORACIC BACK PAIN: ICD-10-CM

## 2019-06-18 PROCEDURE — 97110 THERAPEUTIC EXERCISES: CPT

## 2019-06-18 NOTE — PROGRESS NOTES
Diagnosis: Chronic right-sided thoracic back pain (M54.6,G89.29)    Date of Onset: 5/7/2019        Next MD visit: none scheduled  Fall Risk: standard         Precautions: n/a          Medication Changes since last visit?: No      Subjective: Patient repo

## 2019-06-20 ENCOUNTER — OFFICE VISIT (OUTPATIENT)
Dept: PHYSICAL THERAPY | Age: 29
End: 2019-06-20
Attending: FAMILY MEDICINE
Payer: COMMERCIAL

## 2019-06-20 DIAGNOSIS — M54.6 CHRONIC RIGHT-SIDED THORACIC BACK PAIN: ICD-10-CM

## 2019-06-20 DIAGNOSIS — G89.29 CHRONIC RIGHT-SIDED THORACIC BACK PAIN: ICD-10-CM

## 2019-06-20 PROCEDURE — 97110 THERAPEUTIC EXERCISES: CPT

## 2019-06-20 NOTE — PROGRESS NOTES
Diagnosis: Chronic right-sided thoracic back pain (M54.6,G89.29)    Date of Onset: 5/7/2019        Next MD visit: none scheduled  Fall Risk: standard         Precautions: n/a          Medication Changes since last visit?: No      Subjective: Patient repo goals are in progress. Goals: to be met in 6 weeks  1. Patient will be independent with HEP, its progression, and management of residual symptoms. 2. Patient will report upper back pain of not more than 1/10 during activity.   3. Increase strength to 5

## 2019-06-24 ENCOUNTER — TELEPHONE (OUTPATIENT)
Dept: PHYSICAL THERAPY | Age: 29
End: 2019-06-24

## 2019-06-25 ENCOUNTER — APPOINTMENT (OUTPATIENT)
Dept: PHYSICAL THERAPY | Age: 29
End: 2019-06-25
Attending: FAMILY MEDICINE
Payer: COMMERCIAL

## 2019-06-27 ENCOUNTER — OFFICE VISIT (OUTPATIENT)
Dept: PHYSICAL THERAPY | Age: 29
End: 2019-06-27
Attending: FAMILY MEDICINE
Payer: COMMERCIAL

## 2019-06-27 DIAGNOSIS — G89.29 CHRONIC RIGHT-SIDED THORACIC BACK PAIN: ICD-10-CM

## 2019-06-27 DIAGNOSIS — M54.6 CHRONIC RIGHT-SIDED THORACIC BACK PAIN: ICD-10-CM

## 2019-06-27 PROCEDURE — 97110 THERAPEUTIC EXERCISES: CPT | Performed by: PHYSICAL THERAPIST

## 2019-06-27 NOTE — PROGRESS NOTES
Diagnosis: Chronic right-sided thoracic back pain (M54.6,G89.29)    Date of Onset: 5/7/2019        Next MD visit: none scheduled  Fall Risk: standard         Precautions: n/a          Medication Changes since last visit?: No      Subjective: Patient stat standing facing wall lower trap lift off wall 10\" holds x 20  - sharapova's with RTB 2 x 10 each  - serratus wall slide with towel x 20  - cat/camel stretch x 20 each  - quadruped thoracic rotation R/L 10 x 2 sets each   - prone \"W\" squeeze 10 x 3   - p

## 2019-07-02 ENCOUNTER — OFFICE VISIT (OUTPATIENT)
Dept: PHYSICAL THERAPY | Age: 29
End: 2019-07-02
Attending: FAMILY MEDICINE
Payer: COMMERCIAL

## 2019-07-02 DIAGNOSIS — M54.6 CHRONIC RIGHT-SIDED THORACIC BACK PAIN: ICD-10-CM

## 2019-07-02 DIAGNOSIS — G89.29 CHRONIC RIGHT-SIDED THORACIC BACK PAIN: ICD-10-CM

## 2019-07-02 PROCEDURE — 97110 THERAPEUTIC EXERCISES: CPT

## 2019-07-02 NOTE — PROGRESS NOTES
Diagnosis: Chronic right-sided thoracic back pain (M54.6,G89.29)    Date of Onset: 5/7/2019        Next MD visit: none scheduled  Fall Risk: standard         Precautions: n/a          Medication Changes since last visit?: No      Subjective: Patient repo each  - foam roll thoracic extension x 2 minutes    - face pull with rope 10# 10 x 3 sets   - trunk rotation with straight bar 30# 10 x 3  - trunk flexed B shoulder extension with straight bar 45# 10 x 2   - standing B shoulder ER in neutral with [address] 10 x be met in 6 weeks  1. Patient will be independent with HEP, its progression, and management of residual symptoms. 2. Patient will report upper back pain of not more than 1/10 during activity.   3. Increase strength to 5/5 throughout to be able to follow go

## 2019-07-09 ENCOUNTER — OFFICE VISIT (OUTPATIENT)
Dept: PHYSICAL THERAPY | Age: 29
End: 2019-07-09
Attending: FAMILY MEDICINE
Payer: COMMERCIAL

## 2019-07-09 DIAGNOSIS — M54.6 CHRONIC RIGHT-SIDED THORACIC BACK PAIN: ICD-10-CM

## 2019-07-09 DIAGNOSIS — G89.29 CHRONIC RIGHT-SIDED THORACIC BACK PAIN: ICD-10-CM

## 2019-07-09 PROCEDURE — 97110 THERAPEUTIC EXERCISES: CPT

## 2019-07-09 NOTE — PROGRESS NOTES
Diagnosis: Chronic right-sided thoracic back pain (M54.6,G89.29)    Date of Onset: 5/7/2019        Next MD visit: none scheduled  Fall Risk: standard         Precautions: n/a          Medication Changes since last visit?: No      Subjective: Patient repo cat/camel stretch x 20 each  - quadruped thoracic rotation R/L 10 x 2 sets each   - bird dog 10 second holds x 9 each   - supine serratus punch 10# DBs 10 x 2  - R/L Spring curl up x 15 each  - foam roll thoracic extension x 2 minutes    - face pull with r each  - serratus wall slide with towel x 20  - cat/camel stretch x 20 each  - quadruped thoracic rotation R/L 10 x 2 sets each   - prone \"W\" squeeze 10 x 3   - prone horizontal abduction with ER 10 x 2   - supine OH shoulder flexion AROM 10 x 2   - supin

## 2019-07-11 ENCOUNTER — TELEPHONE (OUTPATIENT)
Dept: PHYSICAL THERAPY | Age: 29
End: 2019-07-11

## 2019-07-18 ENCOUNTER — APPOINTMENT (OUTPATIENT)
Dept: PHYSICAL THERAPY | Age: 29
End: 2019-07-18
Attending: FAMILY MEDICINE
Payer: COMMERCIAL

## 2019-07-25 ENCOUNTER — OFFICE VISIT (OUTPATIENT)
Dept: PHYSICAL THERAPY | Age: 29
End: 2019-07-25
Attending: FAMILY MEDICINE
Payer: COMMERCIAL

## 2019-07-25 PROCEDURE — 97110 THERAPEUTIC EXERCISES: CPT

## 2019-07-25 NOTE — PROGRESS NOTES
Diagnosis: Chronic right-sided thoracic back pain (M54.6,G89.29)    Date of Onset: 5/7/2019        Next MD visit: none scheduled  Fall Risk: standard         Precautions: n/a          Medication Changes since last visit?: No      Subjective: Patient report Spring curl up x 20 each  - foam roll thoracic extension x 2 minutes    - sidelying R shoulder ER with towel under elbow 3# DB 2 x 30 reps   - trunk flexed B shoulder extension with straight bar 45# 10 x 3    - standing B shoulder ER in neutral with [address] 10 B shoulder ER in neutral with [address] 10 x 3 sets    - cat/camel stretch x 20 each  - quadruped thoracic rotation R/L 10 x 2 sets each   - bird dog 2 x 10 each   - prone \"W\" squeeze 10 x 3 with 1# DB   - prone horizontal abduction with ER 10 x 3 with 1# DB work tasks without postural deviations. 4. Increase trunk ROM to [facility] into all planes to improve overall mobility. MET    Plan: After collaboration with patient discharge with updated HEP.      Charges: EX 3       Total Timed Treatment: 45 min  Total Treat

## 2019-10-22 ENCOUNTER — PATIENT MESSAGE (OUTPATIENT)
Dept: FAMILY MEDICINE CLINIC | Facility: CLINIC | Age: 29
End: 2019-10-22

## 2019-10-22 DIAGNOSIS — G89.29 CHRONIC RIGHT-SIDED THORACIC BACK PAIN: Primary | ICD-10-CM

## 2019-10-22 DIAGNOSIS — M54.6 CHRONIC RIGHT-SIDED THORACIC BACK PAIN: Primary | ICD-10-CM

## 2019-10-23 NOTE — TELEPHONE ENCOUNTER
From: Nallely Mac  To: Denise Hummel DO  Sent: 10/22/2019 7:59 PM CDT  Subject: Referral Request    Hi Dr. Estefania Lambert, hope you are doing well. I'm writing to you because I want to request a referral for a chiropractor.  I have done xrays with you recently as

## 2019-11-26 ENCOUNTER — NURSE TRIAGE (OUTPATIENT)
Dept: OTHER | Age: 29
End: 2019-11-26

## 2019-11-26 ENCOUNTER — PATIENT MESSAGE (OUTPATIENT)
Dept: FAMILY MEDICINE CLINIC | Facility: CLINIC | Age: 29
End: 2019-11-26

## 2019-11-27 NOTE — TELEPHONE ENCOUNTER
Action Requested: Summary for Provider     []  Critical Lab, Recommendations Needed  [] Need Additional Advice  []   FYI    []   Need Orders  [] Need Medications Sent to Pharmacy  []  Other     SUMMARY: Per protocol advised OV, scheduled appointment with [facility]

## 2019-11-27 NOTE — TELEPHONE ENCOUNTER
Please contact patient to further assess.     Non-Urgent Medical Question     Sam Posada DO 3 hours ago (6:23 PM)        Jonathan Vaca I recently went in to do a wellness check for work and they told me I had a low glucose level and that I shoul

## 2019-11-27 NOTE — TELEPHONE ENCOUNTER
From: Judi Paredes  To: Rebecca Jhaveri DO  Sent: 11/26/2019 6:23 PM CST  Subject: Non-Urgent Rosy Block Dr. I recently went in to do a wellness check for work and they told me I had a low glucose level and that I should see you about it.     Than

## 2019-12-02 ENCOUNTER — LAB ENCOUNTER (OUTPATIENT)
Dept: LAB | Age: 29
End: 2019-12-02
Attending: FAMILY MEDICINE
Payer: COMMERCIAL

## 2019-12-02 ENCOUNTER — OFFICE VISIT (OUTPATIENT)
Dept: FAMILY MEDICINE CLINIC | Facility: CLINIC | Age: 29
End: 2019-12-02
Payer: COMMERCIAL

## 2019-12-02 VITALS
SYSTOLIC BLOOD PRESSURE: 105 MMHG | DIASTOLIC BLOOD PRESSURE: 70 MMHG | HEART RATE: 56 BPM | BODY MASS INDEX: 29.3 KG/M2 | WEIGHT: 197.81 LBS | TEMPERATURE: 98 F | HEIGHT: 69 IN

## 2019-12-02 DIAGNOSIS — Z00.00 ADULT GENERAL MEDICAL EXAM: Primary | ICD-10-CM

## 2019-12-02 DIAGNOSIS — L30.9 DERMATITIS: ICD-10-CM

## 2019-12-02 DIAGNOSIS — D22.9 NEVUS: ICD-10-CM

## 2019-12-02 DIAGNOSIS — Z00.00 ADULT GENERAL MEDICAL EXAM: ICD-10-CM

## 2019-12-02 DIAGNOSIS — L70.0 ACNE VULGARIS: ICD-10-CM

## 2019-12-02 DIAGNOSIS — Z11.3 SCREEN FOR STD (SEXUALLY TRANSMITTED DISEASE): ICD-10-CM

## 2019-12-02 DIAGNOSIS — Z23 NEED FOR VACCINATION: ICD-10-CM

## 2019-12-02 PROCEDURE — 87591 N.GONORRHOEAE DNA AMP PROB: CPT

## 2019-12-02 PROCEDURE — 99395 PREV VISIT EST AGE 18-39: CPT | Performed by: FAMILY MEDICINE

## 2019-12-02 PROCEDURE — 36415 COLL VENOUS BLD VENIPUNCTURE: CPT

## 2019-12-02 PROCEDURE — 85025 COMPLETE CBC W/AUTO DIFF WBC: CPT

## 2019-12-02 PROCEDURE — 87491 CHLMYD TRACH DNA AMP PROBE: CPT

## 2019-12-02 PROCEDURE — 90471 IMMUNIZATION ADMIN: CPT | Performed by: FAMILY MEDICINE

## 2019-12-02 PROCEDURE — 80061 LIPID PANEL: CPT

## 2019-12-02 PROCEDURE — 90686 IIV4 VACC NO PRSV 0.5 ML IM: CPT | Performed by: FAMILY MEDICINE

## 2019-12-02 PROCEDURE — 84443 ASSAY THYROID STIM HORMONE: CPT

## 2019-12-02 PROCEDURE — 80053 COMPREHEN METABOLIC PANEL: CPT

## 2019-12-02 NOTE — PROGRESS NOTES
Patient ID: Elizabet Davidson is a 29year old male. HPI  Patient presents with:  Physical: patient is requesting std testing    Pt is single and does not smoke. He works in medical delivery to hospitals.  He regularly works out at [employer] with [employer] and do 10/26/2018    MOPERCENT 10 10/26/2018    EOPERCENT 1 10/26/2018    BAPERCENT 1 10/26/2018    NE 2.6 10/26/2018    LYMABS 1.9 10/26/2018    MOABSO 0.5 10/26/2018    EOABSO 0.1 10/26/2018    BAABSO 0.1 10/26/2018       Lab Results   Component Value Date    G kg)  10/12/17 : 203 lb (92.1 kg)              BMI Readings from Last 6 Encounters:  12/02/19 : 29.21 kg/m²  05/07/19 : 29.39 kg/m²  04/25/19 : 29.83 kg/m²  10/26/18 : 29.39 kg/m²  05/24/18 : 28.10 kg/m²  10/12/17 : 29.98 kg/m²      BP Readings from Last 6 Lifestyle      Physical activity:        Days per week: Not on file        Minutes per session: Not on file      Stress: Not on file    Relationships      Social connections:        Talks on phone: Not on file        Gets together: Not on file        Atten thoracic area. Circular in nature. Homogeneous in color. No suspicious features. Psychiatric: He has a normal mood and affect. Exam of the penis is normal. There is no erythema and there is no discharge from the meatus.   Testes are descended bilatera Number of Visits Requested:3        Nikkis Milks  12/2/2019      By signing my name below, Nate Thompson,  attest that this documentation has been prepared under the direction and in the presence of Domonique Zapata DO.    Electronically Signed: [facility]

## 2019-12-26 ENCOUNTER — PATIENT MESSAGE (OUTPATIENT)
Dept: FAMILY MEDICINE CLINIC | Facility: CLINIC | Age: 29
End: 2019-12-26

## 2019-12-26 NOTE — TELEPHONE ENCOUNTER
From: Tim Gordillo  To: Kaiden Warner DO  Sent: 12/26/2019 11:18 AM CST  Subject: Non-Urgent Leatha Garcia Blow  The dermatologists that you recommended me are booked for the next 6 months. Are there other options or are those the only ones?

## 2019-12-27 NOTE — TELEPHONE ENCOUNTER
Hi Dr. Charmayne Maser   The dermatologists that you recommended me are booked for the next 6 months. Are there other options or are those the only ones? Thank you   -Kim Kothari        [facility]   Anyway your office can g

## 2020-01-02 ENCOUNTER — OFFICE VISIT (OUTPATIENT)
Dept: DERMATOLOGY CLINIC | Facility: CLINIC | Age: 30
End: 2020-01-02
Payer: COMMERCIAL

## 2020-01-02 DIAGNOSIS — L71.0 PERIORAL DERMATITIS: Primary | ICD-10-CM

## 2020-01-02 PROCEDURE — 99202 OFFICE O/P NEW SF 15 MIN: CPT | Performed by: DERMATOLOGY

## 2020-01-02 RX ORDER — MINOCYCLINE HYDROCHLORIDE 100 MG/1
CAPSULE ORAL
Qty: 60 CAPSULE | Refills: 12 | Status: SHIPPED | OUTPATIENT
Start: 2020-01-02

## 2020-01-02 RX ORDER — AMOXICILLIN 500 MG/1
CAPSULE ORAL
COMMUNITY
Start: 2019-12-06

## 2020-01-02 RX ORDER — TRAMADOL HYDROCHLORIDE 50 MG/1
50 TABLET ORAL
COMMUNITY
Start: 2019-12-06

## 2020-01-02 RX ORDER — IBUPROFEN 600 MG/1
TABLET ORAL
COMMUNITY
Start: 2019-12-06

## 2020-01-02 RX ORDER — ADAPALENE 3 MG/G
1 GEL TOPICAL NIGHTLY
Qty: 45 G | Refills: 12 | Status: SHIPPED | OUTPATIENT
Start: 2020-01-02 | End: 2020-02-01

## 2020-01-12 NOTE — PROGRESS NOTES
Latanya Escoto is a 34year old male. Patient presents with:  Acne: New pt. pt presenting today with acne to face for a  1 year n half. pt has used OTC medications with slight improvement. Patient has no known allergies.   Current Outpatient Me Spouse name: Not on file      Number of children: Not on file      Years of education: Not on file      Highest education level: Not on file    Occupational History      Not on file    Social Needs      Financial resource strain: Not on file      Food inse improvement. Otherwise no topical or oral medications. Has tried various over-the-counter products without improvement. Nothing is really new or different    ROS:    Denies any other systemic complaints.   No fevers, chills, night sweats, photosensitivit Predominantly comedonal and postinflammatory changes see medications in grid. Skin care regimen discussed at length including cleansers, makeup, face washing, sunscreen. Recheck in 6 -8 weeks if no improvement.   Notify us promptly if problems tolerating

## 2020-07-18 ENCOUNTER — PATIENT MESSAGE (OUTPATIENT)
Dept: DERMATOLOGY CLINIC | Facility: CLINIC | Age: 30
End: 2020-07-18

## 2020-07-20 NOTE — TELEPHONE ENCOUNTER
LMTCB - please add pt in. To [address]  - please see attached photo of his cyst, will add in when he calls back.

## 2020-07-20 NOTE — TELEPHONE ENCOUNTER
From: Laura Ruffin  To: Yue Glover MD  Sent: 7/18/2020 1:31 PM CDT  Subject: Other    Can i set up a appointment so you can look at the cyst? I can feel minor pain throughout the whole day.  Thank you

## 2020-07-23 ENCOUNTER — OFFICE VISIT (OUTPATIENT)
Dept: DERMATOLOGY CLINIC | Facility: CLINIC | Age: 30
End: 2020-07-23
Payer: COMMERCIAL

## 2020-07-23 DIAGNOSIS — L70.0 ACNE VULGARIS: Primary | ICD-10-CM

## 2020-07-23 PROCEDURE — 99213 OFFICE O/P EST LOW 20 MIN: CPT | Performed by: DERMATOLOGY

## 2020-07-23 RX ORDER — MINOCYCLINE HYDROCHLORIDE 100 MG/1
100 CAPSULE ORAL 2 TIMES DAILY
Qty: 60 CAPSULE | Refills: 12 | Status: SHIPPED | OUTPATIENT
Start: 2020-07-23

## 2020-07-27 NOTE — PROGRESS NOTES
Sandra Brar is a 34year old male. Patient presents with:  Lesion: LV 1/2/2020. Pt presents with lesion on right cheek x1 week. Yellow and bloody drainage yesterday. Pt was taking old amoxicillin PO to help with lesion. No topical treatments. Allergies    History reviewed. No pertinent past medical history.   Past Surgical History:   Procedure Laterality Date   • KNEE ARTHROSCOPY ACL RECONSTRUCTION Left 5/24/2018    Performed by Michelle Nicholas MD at [address] [place] [address] MAIN OR     Social History    [place] with lesion on right cheek x1 week. Yellow and bloody drainage yesterday. Pt was taking old amoxicillin PO to help with lesion. No topical treatments. Patient with larger cystic lesion had minocycline at home 100 mg twice daily has been taking this.   P Acne vulgaris  (primary encounter diagnosis)      RTC:   6 to 8 weeks as above if needed OR prn    The patient indicates understanding of these issues and agrees to the plan. The patient is asked to return as noted in follow-up as above.     This note Patient

## 2020-08-14 ENCOUNTER — HOSPITAL ENCOUNTER (OUTPATIENT)
Age: 30
Discharge: HOME | End: 2020-08-14
Attending: EMERGENCY MEDICINE
Payer: COMMERCIAL

## 2020-08-14 ENCOUNTER — TELEMEDICINE (OUTPATIENT)
Dept: TELEHEALTH | Age: 30
End: 2020-08-14

## 2020-08-14 VITALS
OXYGEN SATURATION: 99 % | BODY MASS INDEX: 29.62 KG/M2 | DIASTOLIC BLOOD PRESSURE: 75 MMHG | RESPIRATION RATE: 16 BRPM | HEIGHT: 69 IN | TEMPERATURE: 99 F | HEART RATE: 78 BPM | WEIGHT: 200 LBS | SYSTOLIC BLOOD PRESSURE: 133 MMHG

## 2020-08-14 DIAGNOSIS — B34.9 VIRAL SYNDROME: Primary | ICD-10-CM

## 2020-08-14 DIAGNOSIS — Z20.822 ENCOUNTER FOR LABORATORY TESTING FOR COVID-19 VIRUS: ICD-10-CM

## 2020-08-14 DIAGNOSIS — Z20.822 SUSPECTED COVID-19 VIRUS INFECTION: Primary | ICD-10-CM

## 2020-08-14 PROCEDURE — 3008F BODY MASS INDEX DOCD: CPT | Performed by: EMERGENCY MEDICINE

## 2020-08-14 PROCEDURE — U0003 INFECTIOUS AGENT DETECTION BY NUCLEIC ACID (DNA OR RNA); SEVERE ACUTE RESPIRATORY SYNDROME CORONAVIRUS 2 (SARS-COV-2) (CORONAVIRUS DISEASE [COVID-19]), AMPLIFIED PROBE TECHNIQUE, MAKING USE OF HIGH THROUGHPUT TECHNOLOGIES AS DESCRIBED BY CMS-2020-01-R: HCPCS | Performed by: EMERGENCY MEDICINE

## 2020-08-14 PROCEDURE — 3078F DIAST BP <80 MM HG: CPT | Performed by: EMERGENCY MEDICINE

## 2020-08-14 PROCEDURE — 99499 UNLISTED E&M SERVICE: CPT | Performed by: PHYSICIAN ASSISTANT

## 2020-08-14 PROCEDURE — 3075F SYST BP GE 130 - 139MM HG: CPT | Performed by: EMERGENCY MEDICINE

## 2020-08-14 PROCEDURE — 99203 OFFICE O/P NEW LOW 30 MIN: CPT | Performed by: EMERGENCY MEDICINE

## 2020-08-14 NOTE — PROGRESS NOTES
Patient scheduled a video visit with [facility] for COVID symptoms. For 3 days, states sore throat, body aches, chest pressure, today with loss of taste. Employer wanted him to get evaluation for testing.  States chest pressure is stable since 3 days ago, no

## 2020-08-14 NOTE — ED PROVIDER NOTES
Patient Seen in: [facility] [place]      History   No chief complaint on file. Stated Complaint: Covid Test-Fatigue, Loss of Taste/Smell, Minor Sore Throat    HPI  Presents with concerns for COVID.   Decreased taste and smell Musculoskeletal: Normal range of motion and neck supple. Cardiovascular:      Rate and Rhythm: Normal rate and regular rhythm. Heart sounds: Normal heart sounds.    Pulmonary:      Effort: Pulmonary effort is normal.      Breath sounds: Normal breath

## 2020-08-16 LAB — SARS-COV-2 RNA RESP QL NAA+PROBE: DETECTED

## 2020-08-25 ENCOUNTER — TELEMEDICINE (OUTPATIENT)
Dept: TELEHEALTH | Age: 30
End: 2020-08-25

## 2020-08-25 DIAGNOSIS — L08.9 SKIN INFECTION: Primary | ICD-10-CM

## 2020-08-25 DIAGNOSIS — Z86.16 HISTORY OF 2019 NOVEL CORONAVIRUS DISEASE (COVID-19): ICD-10-CM

## 2020-08-25 PROCEDURE — 99213 OFFICE O/P EST LOW 20 MIN: CPT | Performed by: NURSE PRACTITIONER

## 2020-08-25 RX ORDER — AMOXICILLIN AND CLAVULANATE POTASSIUM 875; 125 MG/1; MG/1
1 TABLET, FILM COATED ORAL 2 TIMES DAILY
Qty: 20 TABLET | Refills: 0 | Status: SHIPPED | OUTPATIENT
Start: 2020-08-25 | End: 2020-09-04

## 2020-08-25 NOTE — PROGRESS NOTES
Laura Ruffin is a 34year old male here today for a telemedicine/video visit. Laura Ruffin is a 34year old male who is conducting a Video Visit.   He has questions related to his recent COVID-19 infection and has an area on his right cheek that he thinks use: No       REVIEW OF SYSTEMS:   GENERAL HEALTH: See HPI  SKIN: See HPI  RESPIRATORY: denies shortness of breath with exertion  CARDIOVASCULAR: denies chest pain on exertion  GI: denies abdominal pain and denies heartburn  NEURO: denies headaches    EXAM

## 2020-09-15 PROBLEM — M54.41 CHRONIC BILATERAL LOW BACK PAIN WITH RIGHT-SIDED SCIATICA: Status: ACTIVE | Noted: 2020-09-15

## 2020-09-15 PROBLEM — G89.29 CHRONIC BILATERAL LOW BACK PAIN WITH RIGHT-SIDED SCIATICA: Status: ACTIVE | Noted: 2020-09-15

## 2020-09-29 PROBLEM — M51.16 DISPLACEMENT OF LUMBAR INTERVERTEBRAL DISC WITH RADICULOPATHY: Status: ACTIVE | Noted: 2020-09-29

## 2021-08-06 NOTE — PROGRESS NOTES
Diagnosis: New ACL tear, left, initial encounter (H11.243M)  Acute medial meniscus tear of left knee, initial encounter (F07.092A)  Acute lateral meniscus tear of left knee, initial encounter (Q80.010H)    Date of Onset: 5/24/2018        Next MD visit: 8/2 Timed Treatment: 40 min  Total Treatment Time: 45 min on unit

## 2022-12-05 ENCOUNTER — OFFICE VISIT (OUTPATIENT)
Dept: FAMILY MEDICINE CLINIC | Facility: CLINIC | Age: 32
End: 2022-12-05
Payer: COMMERCIAL

## 2022-12-05 VITALS
DIASTOLIC BLOOD PRESSURE: 79 MMHG | WEIGHT: 199 LBS | TEMPERATURE: 97 F | BODY MASS INDEX: 29.47 KG/M2 | HEIGHT: 69 IN | SYSTOLIC BLOOD PRESSURE: 126 MMHG | HEART RATE: 54 BPM

## 2022-12-05 DIAGNOSIS — A63.0 CONDYLOMATA ACUMINATA: Primary | ICD-10-CM

## 2022-12-05 DIAGNOSIS — Z23 NEED FOR VACCINATION: ICD-10-CM

## 2022-12-05 PROCEDURE — 3008F BODY MASS INDEX DOCD: CPT | Performed by: FAMILY MEDICINE

## 2022-12-05 PROCEDURE — 90651 9VHPV VACCINE 2/3 DOSE IM: CPT | Performed by: FAMILY MEDICINE

## 2022-12-05 PROCEDURE — 3074F SYST BP LT 130 MM HG: CPT | Performed by: FAMILY MEDICINE

## 2022-12-05 PROCEDURE — 3078F DIAST BP <80 MM HG: CPT | Performed by: FAMILY MEDICINE

## 2022-12-05 PROCEDURE — 99202 OFFICE O/P NEW SF 15 MIN: CPT | Performed by: FAMILY MEDICINE

## 2022-12-05 PROCEDURE — 90471 IMMUNIZATION ADMIN: CPT | Performed by: FAMILY MEDICINE

## 2022-12-05 RX ORDER — PODOFILOX 5 MG/ML
SOLUTION TOPICAL
Qty: 3.5 ML | Refills: 0 | Status: SHIPPED | OUTPATIENT
Start: 2022-12-05

## 2022-12-05 NOTE — PATIENT INSTRUCTIONS
Return to clinic after February 5, 2023 for a nurse visit for the second HPV vaccine and then I will have you come back 4 months later for the third and last injection.

## 2023-02-20 ENCOUNTER — OFFICE VISIT (OUTPATIENT)
Dept: FAMILY MEDICINE CLINIC | Facility: CLINIC | Age: 33
End: 2023-02-20

## 2023-02-20 VITALS
WEIGHT: 200 LBS | HEART RATE: 70 BPM | DIASTOLIC BLOOD PRESSURE: 74 MMHG | SYSTOLIC BLOOD PRESSURE: 117 MMHG | TEMPERATURE: 97 F | HEIGHT: 69 IN | BODY MASS INDEX: 29.62 KG/M2

## 2023-02-20 DIAGNOSIS — Z23 NEED FOR VACCINATION: ICD-10-CM

## 2023-02-20 DIAGNOSIS — D22.9 BENIGN NEVUS: Primary | ICD-10-CM

## 2023-02-20 PROCEDURE — 99213 OFFICE O/P EST LOW 20 MIN: CPT | Performed by: FAMILY MEDICINE

## 2023-02-20 PROCEDURE — 90471 IMMUNIZATION ADMIN: CPT | Performed by: FAMILY MEDICINE

## 2023-02-20 PROCEDURE — 3074F SYST BP LT 130 MM HG: CPT | Performed by: FAMILY MEDICINE

## 2023-02-20 PROCEDURE — 90651 9VHPV VACCINE 2/3 DOSE IM: CPT | Performed by: FAMILY MEDICINE

## 2023-02-20 PROCEDURE — 3008F BODY MASS INDEX DOCD: CPT | Performed by: FAMILY MEDICINE

## 2023-02-20 PROCEDURE — 3078F DIAST BP <80 MM HG: CPT | Performed by: FAMILY MEDICINE

## 2023-07-05 ENCOUNTER — PATIENT MESSAGE (OUTPATIENT)
Dept: FAMILY MEDICINE CLINIC | Facility: CLINIC | Age: 33
End: 2023-07-05

## 2023-07-05 NOTE — TELEPHONE ENCOUNTER
From: Monica Bro  To: Radha Connelly DO  Sent: 7/5/2023 1:28 PM CDT  Subject: Regarding the hpv     Hello Dr. And/or person reading this,    I've come in for hpv inquiries before and I've notice a possible breakout on my lip (see attached). You've given me a medication before but that was for my penis. I doubt that is safe to put on my lip, is there something you can prescribe me and/or something I can buy at [employer] for it? I do have an appointment coming up for a physical soon at the end of the month. Just messaging in hopes there is something I can do about it now since it's embarrassing.      Best,

## 2023-07-24 ENCOUNTER — OFFICE VISIT (OUTPATIENT)
Dept: FAMILY MEDICINE CLINIC | Facility: CLINIC | Age: 33
End: 2023-07-24

## 2023-07-24 VITALS
HEIGHT: 69 IN | TEMPERATURE: 98 F | DIASTOLIC BLOOD PRESSURE: 83 MMHG | HEART RATE: 63 BPM | WEIGHT: 216 LBS | BODY MASS INDEX: 31.99 KG/M2 | SYSTOLIC BLOOD PRESSURE: 127 MMHG

## 2023-07-24 DIAGNOSIS — Z23 NEED FOR VACCINATION: ICD-10-CM

## 2023-07-24 DIAGNOSIS — K13.0 LIP LESION: ICD-10-CM

## 2023-07-24 DIAGNOSIS — J30.89 OTHER ALLERGIC RHINITIS: ICD-10-CM

## 2023-07-24 DIAGNOSIS — Z00.00 ADULT GENERAL MEDICAL EXAM: Primary | ICD-10-CM

## 2023-07-24 PROCEDURE — 3008F BODY MASS INDEX DOCD: CPT | Performed by: FAMILY MEDICINE

## 2023-07-24 PROCEDURE — 3079F DIAST BP 80-89 MM HG: CPT | Performed by: FAMILY MEDICINE

## 2023-07-24 PROCEDURE — 3074F SYST BP LT 130 MM HG: CPT | Performed by: FAMILY MEDICINE

## 2023-07-24 PROCEDURE — 99395 PREV VISIT EST AGE 18-39: CPT | Performed by: FAMILY MEDICINE

## 2023-07-24 PROCEDURE — 90471 IMMUNIZATION ADMIN: CPT | Performed by: FAMILY MEDICINE

## 2023-07-24 PROCEDURE — 90651 9VHPV VACCINE 2/3 DOSE IM: CPT | Performed by: FAMILY MEDICINE

## 2023-08-16 NOTE — PROGRESS NOTES
August 16, 2023    Established patient     CHIEF COMPLAINT: Lesion    HISTORY OF PRESENT ILLNESS: .    1. Lesion  Location: Lip  Duration: x 7 months  Signs and symptoms: Inflammed at times (more apparent), non-healing  Current treatment: None  Past treatments: Hydrocortisone cream, moisturizer, coconut oil     DERM HISTORY:  History of skin cancer: No    FAMILY HISTORY:  History of melanoma: No    History/Other:    REVIEW OF SYSTEMS:  Constitutional: Denies fever, chills, unintentional weight loss. Skin as per HPI    PAST MEDICAL HISTORY:  No past medical history on file. Medications  No current outpatient medications on file. Objective:    PHYSICAL EXAM:  General: awake, alert, no acute distress  Skin: Skin exam was performed today including the following: Left upper cutaneous lip. Pertinent findings include:   -Pink sclerotic papule    ASSESSMENT & PLAN:  Pathophysiology of diagnoses discussed with patient. Therapeutic options reviewed. Risks, benefits, and alternatives discussed with patient. Instructions reviewed at length. Scarring fibrosis of skin, left upper cutaneous lip  - Discussed likely relation to prior ingrown hair or cold sore and recommended continued monitoring.       Return to clinic: as needed or sooner if something concerning arises     Philip aHrt MD

## 2024-07-22 NOTE — PROGRESS NOTES
Subjective:   Caleb Ruff is a 33 year old male who presents for Rash (Gential, past week, was sensitive , looked like pimple ,got red, all white patches and painful, patches peeled off )   Patient is a pleasant 33-year-old male with past medical history consistent for genital warts and allergic rhinitis.  Patient presents to office today new to this provider for evaluation of genital rash.  Patient states last week had pain on left inner thigh. He then had a pimple or white patch. Three days later it started falling off. It has been burning and intermittently ithcing. He recently shave genitals and was placing h2O2 as well as lotrimin on area.  Now only remains in fold between upper groin and leg.         History reviewed. No pertinent past medical history.   History reviewed. No pertinent surgical history.     History/Other:    Chief Complaint Reviewed and Verified  Nursing Notes Reviewed and   Verified  Tobacco Reviewed  Allergies Reviewed  Medications Reviewed    Medical History Reviewed  Surgical History Reviewed  Family History   Reviewed  Social History Reviewed         Tobacco:  He has never smoked tobacco.    Current Outpatient Medications   Medication Sig Dispense Refill    clotrimazole-betamethasone 1-0.05 % External Cream Apply 1 Application topically 2 (two) times daily for 14 days. 15 g 0         Review of Systems:  Review of Systems   Respiratory:  Negative for chest tightness and shortness of breath.    Cardiovascular:  Negative for chest pain.   Skin:  Positive for rash.   All other systems reviewed and are negative.        Objective:   /81 (BP Location: Left arm, Patient Position: Sitting, Cuff Size: large)   Pulse 51   Temp 96.7 °F (35.9 °C)   Ht 5' 9\" (1.753 m)   Wt 206 lb (93.4 kg)   SpO2 99%   BMI 30.42 kg/m²  Estimated body mass index is 30.42 kg/m² as calculated from the following:    Height as of this encounter: 5' 9\" (1.753 m).    Weight as of this encounter: 206  lb (93.4 kg).  Physical Exam  Vitals and nursing note reviewed.   Constitutional:       Appearance: Normal appearance.   Cardiovascular:      Rate and Rhythm: Normal rate and regular rhythm.      Pulses: Normal pulses.      Heart sounds: Normal heart sounds.   Pulmonary:      Effort: Pulmonary effort is normal.      Breath sounds: Normal breath sounds.   Genitourinary:     Penis: Normal.    Skin:     General: Skin is warm and dry.      Capillary Refill: Capillary refill takes less than 2 seconds.      Findings: Erythema and rash present.      Comments: Candida type rash to inner left upper leg at the fold between groin and leg  satellite lesions feel visible     Neurological:      Mental Status: He is alert and oriented to person, place, and time.           Assessment & Plan:   1. Candidal intertrigo (Primary)  2. Folliculitis  Other orders  -     Clotrimazole-Betamethasone; Apply 1 Application topically 2 (two) times daily for 14 days.  Dispense: 15 g; Refill: 0  Patient has fungal Infection to the inner groin has been improving on Motrin but unable to remove remainder in fold.   Start clotrimazole betamethasone twice daily x 14 days  If persists will refer to dermatology    Educated on folliculitis  Will abstain from shaving   In future educated on low dose hydrocortisone and warm compresses    Patient aware of plan of care. All questions answered to satisfaction of the patient. Patient instructed to call office or reach out via [facility] if any issues arise. For urgent issues and/or reviewed red flags please proceed to the urgent care or ER.  Also, inform the nurse practitioner with any new symptoms or medication side effects.        Return if symptoms worsen or fail to improve.    Rayshawn Breen, EFRAIN, 7/22/2024, 11:55 AM

## 2024-11-25 NOTE — TELEPHONE ENCOUNTER
Condylox solution ===Podofilox 0.5 % was prescribed in 2022 for genital warts.     [employer] message sent with instruction.     No future appointments.

## 2024-11-26 NOTE — TELEPHONE ENCOUNTER
Patient calling, saying that he would like a refill of medication he has used in the past for genital warts. He says that he has HPV and from time to time gets genital warts.   Attempted to start to triage symptoms and advised that he should be seen for this. Advised last physical visit was 7/2023 and een in July 2024 for rash problem. Due for annual.   Patient expressed that he knows what this is, wants a refill of medication is not coming in for an appointment.     He said \"I am not coming for an appointment, I moved anyway, I am done with you guys\" and abruptly disconnected the phone call.     GONZALOI to PCP     See [facility] message dated 11/24/24 for more details.

## 2024-11-27 NOTE — TELEPHONE ENCOUNTER
Dr. Alvarenga, the nurse below did not suggest an appointment, she wanted him to call the nurse line to collect more information before the request was sent to you. Pt saw Rayshawn on 7/23/24 for genital rash that was dx as candidal intertrigo and folliculitis. Pt's last visit with you, Dr. Alvarenga was July 2023.     Do you want rx as pt is requesting or have pt come for appt?

## 2024-12-02 NOTE — TELEPHONE ENCOUNTER
Called pharmacy and confirmed medication was not in stock, they did order for patient.   Confirmed no insurance issue, no prior auth needed.   Patient notified via [facility].

## 2025-02-10 NOTE — PROGRESS NOTES
Patient ID: Caleb Ruff is a 34 year old male.    HPI  Chief Complaint   Patient presents with    Routine Physical     Last physical done on 07/24/2023.    Pt does not smoke and is engaged. He is an .     I reviewed pt's labs and vitals. Pt states he has chronic nasal congestion but doesn't feel that it is bad enough to do anything about it. I discussed with him. Pt is UTD on immunizations.     He wishes to consult on intermittent lower back pain, worse on the right, that he has had for a while. Pt points to the right lumbar area as the site of discomfort. He injured it at the gym and thinks that the pain has slightly worsened. He consulted with physical therapy while he was doing rehab for his knee. I reviewed MRI of the lumbar spine from 9/20/2020 which showed a disc protrusion. Pt states that his back will feel tight and he sits a lot at work. points to right lumbar area as the site of discomfort. Denies numbness or tingling in his lower extremities. He has been regularly exercising and swimming. I discussed with him.     Health Maintenance   Topic Date Due    Annual Physical  07/24/2024    COVID-19 Vaccine (3 - 2024-25 season) 09/01/2024    Influenza Vaccine (1) 10/01/2024    Annual Depression Screening  01/01/2025    DTaP,Tdap,and Td Vaccines (3 - Td or Tdap) 10/26/2029    HPV Vaccines Ages 27-45  Completed    Pneumococcal Vaccine: Birth to 50yrs  Aged Out    Meningococcal B Vaccine  Aged Out       =======================================================    Lab Results   Component Value Date    WBC 4.8 07/24/2023    RBC 4.94 07/24/2023    HGB 14.1 07/24/2023    HCT 43.8 07/24/2023    .0 07/24/2023    MPV 7.8 10/26/2018    MCV 88.7 07/24/2023    MCH 28.5 07/24/2023    MCHC 32.2 07/24/2023    RDW 12.3 07/24/2023    NEPRELIM 2.62 07/24/2023    NEPERCENT 54.9 07/24/2023    LYPERCENT 31.6 07/24/2023    MOPERCENT 11.3 07/24/2023    EOPERCENT 0.8 07/24/2023    BAPERCENT 1.0 07/24/2023    NE 2.62  07/24/2023    LYMABS 1.51 07/24/2023    MOABSO 0.54 07/24/2023    EOABSO 0.04 07/24/2023    BAABSO 0.05 07/24/2023       Lab Results   Component Value Date    GLU 87 07/24/2023    BUN 12 07/24/2023    BUNCREA 12.4 12/02/2019    CREATSERUM 1.22 07/24/2023    ANIONGAP 2 07/24/2023    GFRNAA 96 12/02/2019    GFRAA 111 12/02/2019    CA 8.8 07/24/2023    OSMOCALC 283 07/24/2023    ALKPHO 54 07/24/2023    AST 52 (H) 07/24/2023    ALT 49 07/24/2023    BILT 0.3 07/24/2023    TP 7.5 07/24/2023    ALB 3.9 07/24/2023    GLOBULIN 3.6 07/24/2023     07/24/2023    K 4.5 07/24/2023     07/24/2023    CO2 28.0 07/24/2023       Lab Results   Component Value Date    GLU 87 07/24/2023    BUN 12 07/24/2023    CREATSERUM 1.22 07/24/2023    BUNCREA 12.4 12/02/2019    ANIONGAP 2 07/24/2023    GFRAA 111 12/02/2019    GFRNAA 96 12/02/2019    CA 8.8 07/24/2023     07/24/2023    K 4.5 07/24/2023     07/24/2023    CO2 28.0 07/24/2023    OSMOCALC 283 07/24/2023       Lab Results   Component Value Date    COLORUR Yellow 10/12/2017    CLARITY Clear 10/12/2017    SPECGRAVITY 1.021 10/12/2017    GLUUR Negative 10/12/2017    BILUR Negative 10/12/2017    KETUR Negative 10/12/2017    BLOODURINE Negative 10/12/2017    PHURINE 5.0 10/12/2017    PROUR Negative 10/12/2017    UROBILINOGEN <2.0 10/12/2017    NITRITE Negative 10/12/2017    LEUUR Negative 10/12/2017    NMIC Microscopic not indicated 10/12/2017       Lab Results   Component Value Date    CHOLEST 185 07/24/2023    TRIG 183 (H) 07/24/2023    HDL 64 (H) 07/24/2023    LDL 90 07/24/2023    VLDL 30 07/24/2023    NONHDLC 121 07/24/2023     TSH (mIU/mL)   Date Value   07/24/2023 1.720       =======================================================    Wt Readings from Last 6 Encounters:   02/10/25 202 lb 3.2 oz   07/23/24 206 lb   07/24/23 216 lb   02/20/23 200 lb   12/05/22 199 lb   09/29/20 200 lb               BMI Readings from Last 6 Encounters:   02/10/25 29.86 kg/m²    07/23/24 30.42 kg/m²   07/24/23 31.90 kg/m²   02/20/23 29.53 kg/m²   12/05/22 29.39 kg/m²   09/29/20 29.53 kg/m²       BP Readings from Last 6 Encounters:   02/10/25 120/72   07/23/24 132/81   07/24/23 127/83   02/20/23 117/74   12/05/22 126/79   08/14/20 133/75         Review of Systems   HENT:  Positive for congestion.    Respiratory:  Negative for shortness of breath.    Cardiovascular:  Negative for chest pain.   Musculoskeletal:  Positive for back pain.         No past medical history on file.    No past surgical history on file.    Social History     Socioeconomic History    Marital status: Single     Spouse name: Not on file    Number of children: Not on file    Years of education: Not on file    Highest education level: Not on file   Occupational History    Not on file   Tobacco Use    Smoking status: Never     Passive exposure: Never    Smokeless tobacco: Never   Vaping Use    Vaping status: Never Used   Substance and Sexual Activity    Alcohol use: Yes     Comment: SOCIALLY    Drug use: No    Sexual activity: Not on file   Other Topics Concern    Grew up on a farm Not Asked    History of tanning Not Asked    Outdoor occupation Not Asked    Reaction to local anesthetic No   Social History Narrative    Not on file     Social Drivers of Health     Food Insecurity: Not on file   Transportation Needs: Not on file   Stress: Not on file   Housing Stability: Not on file          No current outpatient medications on file.     Allergies:Allergies[1]   PHYSICAL EXAM:   Physical Exam    Physical Exam   Constitutional: He appears well-developed and well-nourished. No distress.   Head: Normocephalic.   Right Ear: Tympanic membrane and ear canal normal.   Left Ear: Tympanic membrane and ear canal normal.   Nose:  Minimally congested but no infection.   Mouth/Throat: Oropharynx is clear and moist and mucous membranes are normal.   Eyes: Conjunctivae and EOM are normal. Pupils are equal, round, and reactive to light.    Neck: Normal range of motion. Neck supple. No thyromegaly present.   Cardiovascular: Normal rate, regular rhythm and no murmur heard.   Pulmonary/Chest: Effort normal and breath sounds normal. No respiratory distress.   Abdominal: Soft. Bowel sounds are normal. There is no hepatosplenomegaly. There is no tenderness.   Lymphadenopathy: He has no cervical adenopathy.   Neurological: He is alert and oriented to person, place, and time. He has normal reflexes. No cranial nerve deficit.   Skin: Skin is warm and dry. No rash noted.   Psychiatric: He has a normal mood and affect.  Lower legs: No edema of the legs bilaterally. 2+ pedal pulses.       --------------------------------------------------------------  BACK:     Tenderness: No tenderness  Deep Tendon Reflexes were 2 out of 4 bilateral lower extremity  Sensory Exam was intact  Good Strength with plantar flexion and dorsiflexion  Gait was normal.    Able to Flex Forward at the Waist and touch the floor.   No bony tenderness.  FABERs Test is negative  Straight Leg Raise is negative bilaterally    ----------------------------------------------------------------     Vitals reviewed.    Blood pressure 130/84, pulse 52, temperature 98.2 °F (36.8 °C), temperature source Tympanic, height 5' 9\" (1.753 m), weight 202 lb 3.2 oz.    Vitals:    02/10/25 1701 02/10/25 1713   BP: 130/84 120/72   BP Location: Right arm    Patient Position: Sitting    Cuff Size: large    Pulse: 52    Temp: 98.2 °F (36.8 °C)    TempSrc: Tympanic    Weight: 202 lb 3.2 oz    Height: 5' 9\" (1.753 m)              ASSESSMENT/PLAN:     Diagnoses and all orders for this visit:    Adult general medical exam  -     CBC With Differential With Platelet; Future  -     Comp Metabolic Panel (14); Future  -     Lipid Panel; Future  -     Assay, Thyroid Stim Hormone; Future    Acute right-sided low back pain without sciatica  Reviewed his past MRI with him.  He had questions about when a person would decide on  surgery further back.  I explained this to him as I had back surgery.  He is nowhere near back surgery.  Because he works from home as an  and sits quite a bit I did talk to him about getting up and stretching and doing extension exercises up for his back.  Core strengthening is also helpful such as planks.  Other allergic rhinitis  He will take medication periodically      Referrals (if applicable)  No orders of the defined types were placed in this encounter.      Follow up if symptoms persist.  Take medicine (if given) as prescribed.  Approach to treatment discussed and patient/family member understands and agrees to plan.     No follow-ups on file.    There are no Patient Instructions on file for this visit.    Rufina Kwon    2/10/2025    By signing my name below, IRufina,  attest that this documentation has been prepared under the direction and in the presence of Mark Alvarenga DO.   Electronically Signed: Rufina Kwon, 2/10/2025, 5:01 PM.    I, Mark Alvarenga DO,  personally performed the services described in this documentation. All medical record entries made by the scribe were at my direction and in my presence.  I have reviewed the chart and discharge instructions (if applicable) and agree that the record reflects my personal performance and is accurate and complete.  Mark Alvarenga DO, 2/10/2025, 6:33 PM                  [1] No Known Allergies

## 2025-06-20 NOTE — ED INITIAL ASSESSMENT (HPI)
C/oloss of taste and smell   fatigue since yesterday Post-Care Instructions: I reviewed with the patient in detail post-care instructions. Patient is to wear sunprotection, and avoid picking at any of the treated lesions. Pt may apply Vaseline to crusted or scabbing areas. Consent: The patient's consent was obtained including but not limited to risks of crusting, scabbing, blistering, scarring, darker or lighter pigmentary change, recurrence, incomplete removal and infection. Render Note In Bullet Format When Appropriate: No Show Applicator Variable?: Yes Detail Level: Zone Number Of Freeze-Thaw Cycles: 1 freeze-thaw cycle Duration Of Freeze Thaw-Cycle (Seconds): 10

## (undated) DEVICE — 3M™ STERI-STRIP™ REINFORCED ADHESIVE SKIN CLOSURES, R1547, 1/2 IN X 4 IN (12 MM X 100 MM), 6 STRIPS/ENVELOPE: Brand: 3M™ STERI-STRIP™

## (undated) DEVICE — PAD THRP 16IN WRPON MU LNG STM

## (undated) DEVICE — SOL  .9 1000ML BTL

## (undated) DEVICE — MEDI-VAC NON-CONDUCTIVE SUCTION TUBING: Brand: CARDINAL HEALTH

## (undated) DEVICE — NEEDLE HPO 18GA 1.5IN ECLPS

## (undated) DEVICE — WEBRIL COTTON UNDERCAST PADDING: Brand: WEBRIL

## (undated) DEVICE — PRECISION OFFSET (9.0 X 0.51 X 25.0MM)

## (undated) DEVICE — SUTURE FIBERWIRE S AR-7200

## (undated) DEVICE — SCREW INTFR 28MM 9MM RND SHLDR: Type: IMPLANTABLE DEVICE | Site: KNEE | Status: NON-FUNCTIONAL

## (undated) DEVICE — STERILE TETRA-FLEX CF, ELASTIC BANDAGE, 4" X 5.5YD: Brand: TETRA-FLEX™CF

## (undated) DEVICE — DRAPE SHEET LG

## (undated) DEVICE — POLAR CARE CUBE COOLING UNIT

## (undated) DEVICE — SUTURE VICRYL 2-0 FS-1

## (undated) DEVICE — SUTURE MONOCRYL 4-0 Y845G

## (undated) DEVICE — 3 ML SYRINGE LUER-LOCK TIP: Brand: MONOJECT

## (undated) DEVICE — SOL  .9 3000ML

## (undated) DEVICE — KIT ACL TRANS TIB HALL SAW BLD

## (undated) DEVICE — Device

## (undated) DEVICE — DRAPE SRG 90X60IN BCK TBL CVR

## (undated) DEVICE — SUTURE FIBERLINK FBRWR 2 26IN

## (undated) DEVICE — CHLORAPREP 26ML APPLICATOR

## (undated) DEVICE — BLADE SHVR COOLCUT 13CM 4MM

## (undated) DEVICE — LOWER EXTREMITY: Brand: MEDLINE INDUSTRIES, INC.

## (undated) DEVICE — STERILE LATEX POWDER-FREE SURGICAL GLOVESWITH NITRILE COATING: Brand: PROTEXIS

## (undated) DEVICE — CUTTER ASCP COOLCUT 13CM 4MM

## (undated) DEVICE — BURR SHVR COOLCUT 13CM 4MM 8

## (undated) DEVICE — THIN OFFSET W/8.0MM STOP (9.0 X 0.38 X 27.0MM)

## (undated) DEVICE — ABDOMINAL PAD: Brand: CURITY

## (undated) DEVICE — REAMER LOW PROFILE

## (undated) DEVICE — NEEDLE SPINAL 18X3-1/2 PINK.

## (undated) DEVICE — ZIMMER® STERILE DISPOSABLE TOURNIQUET CUFF WITH PLC, DUAL PORT, SINGLE BLADDER, 30 IN. (76 CM)

## (undated) DEVICE — BRACE ORTH LNG MED 26IN 18-22

## (undated) DEVICE — SUTURE VICRYL 0 CP-1

## (undated) DEVICE — TUBING IRR 16FT CNT WV 3 ASCP

## (undated) DEVICE — 3M™ STERI-STRIP™ COMPOUND BENZOIN TINCTURE 40 BAGS/CARTON 4 CARTONS/CASE C1544: Brand: 3M™ STERI-STRIP™

## (undated) DEVICE — COTTON UNDERCAST PADDING,REGULAR FINISH: Brand: WEBRIL

## (undated) DEVICE — AMBIENT SUPER TURBOVAC 90 IFS: Brand: COBLATION

## (undated) DEVICE — KIT ASCP FX ALL INSD ACL STRL

## (undated) NOTE — LETTER
Dear Dr. Ishmael Schwab    This letter is to inform you that Mary Beth Post has been attending Physical Therapy with me. See below for my most recent plan of care.     Diagnosis: New ACL tear, left, initial encounter (C57.012L)  Acute medial meniscus tear Assessment: Focus of treatment improving L L/E strengthening to address deficits. Patient reports slight discomfort with TM jogging. Collaborated with PT patient response to today's treatment. Plan: Continue PT. Patient to see MD tomorrow.        Guerita

## (undated) NOTE — LETTER
5/25/2018          To Whom It May Concern:    Lu Rodriguez is currently under my medical care and may not return to work at this time. Please excuse Osborn Duverney for 6 weeks. If you require additional information please contact our office.         Sincerely,

## (undated) NOTE — LETTER
10/26/2018          To Whom It May Concern:    Lisa Yadav is currently under my medical care and may return to work with the following restrictions: no heavy lifting for the next 4 weeks.       If you require additional information please contact our offic

## (undated) NOTE — LETTER
1/30/2018              Mary Beth Gamboa         Dear Clau Frederick,    This letter is to inform you that our office has made several attempts to reach you by phone without success.   We were attempting to contact you by ph

## (undated) NOTE — LETTER
Dear Dr. Hannah Varela,    This letter is to inform you that Dinh Tang has been attending Physical Therapy with me. See below for my most recent plan of care.     Diagnosis: New ACL tear, left, initial encounter (X48.399N)  Acute medial meniscus tear of left k - CKC L fwd step up on bench opposite L/E march hold 3\" holds x 20 reps each  - inverted bosu squat side/side tap x 20   - L retro lunge with black t-band above knee (to promote hip ER strengthening) 3 x 10  - 10\" box hops x 15 reps    - L L/E shuttle pl - L L/E shuttle knee extension 7 bands 3 x 10 with black t-band outside of knee  (setting 6)  - B L/E shuttle knee extension 8 bands 3 x 10 reps with black t-band above knees (setting 4)    - B L/E shuttle hops 4 bands 1 x 20 reps (setting 4)   - L SLS RDL